# Patient Record
Sex: MALE | Race: WHITE | NOT HISPANIC OR LATINO | URBAN - METROPOLITAN AREA
[De-identification: names, ages, dates, MRNs, and addresses within clinical notes are randomized per-mention and may not be internally consistent; named-entity substitution may affect disease eponyms.]

---

## 2017-03-17 ENCOUNTER — HOME CARE/HOSPICE - HEALTHEAST (OUTPATIENT)
Dept: HOME HEALTH SERVICES | Facility: HOME HEALTH | Age: 82
End: 2017-03-17

## 2017-03-23 ENCOUNTER — OFFICE VISIT - HEALTHEAST (OUTPATIENT)
Dept: GERIATRICS | Facility: CLINIC | Age: 82
End: 2017-03-23

## 2017-03-23 DIAGNOSIS — D62 ACUTE BLOOD LOSS ANEMIA: ICD-10-CM

## 2017-03-23 DIAGNOSIS — I48.19 PERSISTENT ATRIAL FIBRILLATION (H): ICD-10-CM

## 2017-03-23 DIAGNOSIS — Z95.3 S/P AORTIC VALVE REPLACEMENT WITH BIOPROSTHETIC VALVE: ICD-10-CM

## 2017-03-23 DIAGNOSIS — I50.9 CHF EXACERBATION (H): ICD-10-CM

## 2017-03-23 DIAGNOSIS — K59.00 CONSTIPATION: ICD-10-CM

## 2017-03-23 DIAGNOSIS — R04.0 EPISTAXIS: ICD-10-CM

## 2017-03-27 ENCOUNTER — OFFICE VISIT - HEALTHEAST (OUTPATIENT)
Dept: GERIATRICS | Facility: CLINIC | Age: 82
End: 2017-03-27

## 2017-03-27 DIAGNOSIS — R55 SYNCOPE: ICD-10-CM

## 2017-03-27 DIAGNOSIS — I50.32 CHRONIC DIASTOLIC CONGESTIVE HEART FAILURE (H): ICD-10-CM

## 2017-03-27 DIAGNOSIS — K92.1 MELENA: ICD-10-CM

## 2017-03-27 DIAGNOSIS — D62 ACUTE BLOOD LOSS ANEMIA: ICD-10-CM

## 2017-03-27 DIAGNOSIS — Z95.3 S/P AORTIC VALVE REPLACEMENT WITH BIOPROSTHETIC VALVE: ICD-10-CM

## 2017-03-27 DIAGNOSIS — R04.0 EPISTAXIS: ICD-10-CM

## 2017-03-27 DIAGNOSIS — I48.19 PERSISTENT ATRIAL FIBRILLATION (H): ICD-10-CM

## 2017-03-27 DIAGNOSIS — I25.10 ATHEROSCLEROSIS OF NATIVE CORONARY ARTERY OF NATIVE HEART WITHOUT ANGINA PECTORIS: ICD-10-CM

## 2017-03-29 ENCOUNTER — OFFICE VISIT - HEALTHEAST (OUTPATIENT)
Dept: GERIATRICS | Facility: CLINIC | Age: 82
End: 2017-03-29

## 2017-03-29 DIAGNOSIS — E11.8 TYPE 2 DIABETES MELLITUS WITH COMPLICATION, WITHOUT LONG-TERM CURRENT USE OF INSULIN (H): ICD-10-CM

## 2017-03-29 DIAGNOSIS — I50.32 CHRONIC DIASTOLIC CONGESTIVE HEART FAILURE (H): ICD-10-CM

## 2017-03-29 DIAGNOSIS — G47.33 OBSTRUCTIVE SLEEP APNEA (ADULT) (PEDIATRIC): ICD-10-CM

## 2017-03-29 DIAGNOSIS — K92.1 MELENA: ICD-10-CM

## 2017-03-29 DIAGNOSIS — I48.19 PERSISTENT ATRIAL FIBRILLATION (H): ICD-10-CM

## 2017-03-29 DIAGNOSIS — K59.00 CONSTIPATION: ICD-10-CM

## 2017-03-29 DIAGNOSIS — I10 ESSENTIAL HYPERTENSION WITH GOAL BLOOD PRESSURE LESS THAN 140/90: ICD-10-CM

## 2017-03-31 ENCOUNTER — COMMUNICATION - HEALTHEAST (OUTPATIENT)
Dept: CARDIOLOGY | Facility: CLINIC | Age: 82
End: 2017-03-31

## 2017-04-03 ENCOUNTER — AMBULATORY - HEALTHEAST (OUTPATIENT)
Dept: GERIATRICS | Facility: CLINIC | Age: 82
End: 2017-04-03

## 2017-04-04 ENCOUNTER — OFFICE VISIT - HEALTHEAST (OUTPATIENT)
Dept: GERIATRICS | Facility: CLINIC | Age: 82
End: 2017-04-04

## 2017-04-04 DIAGNOSIS — I50.32 CHRONIC DIASTOLIC CONGESTIVE HEART FAILURE (H): ICD-10-CM

## 2017-04-04 DIAGNOSIS — Z79.01 ANTICOAGULATED ON COUMADIN: ICD-10-CM

## 2017-04-04 DIAGNOSIS — G47.33 OBSTRUCTIVE SLEEP APNEA (ADULT) (PEDIATRIC): ICD-10-CM

## 2017-04-04 DIAGNOSIS — I48.19 PERSISTENT ATRIAL FIBRILLATION (H): ICD-10-CM

## 2017-04-04 DIAGNOSIS — I10 ESSENTIAL HYPERTENSION WITH GOAL BLOOD PRESSURE LESS THAN 140/90: ICD-10-CM

## 2017-04-06 ENCOUNTER — OFFICE VISIT - HEALTHEAST (OUTPATIENT)
Dept: GERIATRICS | Facility: CLINIC | Age: 82
End: 2017-04-06

## 2017-04-06 DIAGNOSIS — I48.19 PERSISTENT ATRIAL FIBRILLATION (H): ICD-10-CM

## 2017-04-06 DIAGNOSIS — I50.32 CHRONIC DIASTOLIC CONGESTIVE HEART FAILURE (H): ICD-10-CM

## 2017-04-06 DIAGNOSIS — E11.8 TYPE 2 DIABETES MELLITUS WITH COMPLICATION, WITHOUT LONG-TERM CURRENT USE OF INSULIN (H): ICD-10-CM

## 2017-04-06 DIAGNOSIS — Z79.01 ANTICOAGULATED ON COUMADIN: ICD-10-CM

## 2017-04-06 DIAGNOSIS — R60.0 BILATERAL LOWER EXTREMITY EDEMA: ICD-10-CM

## 2017-04-10 ENCOUNTER — OFFICE VISIT - HEALTHEAST (OUTPATIENT)
Dept: GERIATRICS | Facility: CLINIC | Age: 82
End: 2017-04-10

## 2017-04-10 DIAGNOSIS — I48.19 PERSISTENT ATRIAL FIBRILLATION (H): ICD-10-CM

## 2017-04-10 DIAGNOSIS — I10 ESSENTIAL HYPERTENSION WITH GOAL BLOOD PRESSURE LESS THAN 140/90: ICD-10-CM

## 2017-04-10 DIAGNOSIS — G47.33 OBSTRUCTIVE SLEEP APNEA (ADULT) (PEDIATRIC): ICD-10-CM

## 2017-04-10 DIAGNOSIS — K59.00 CONSTIPATION: ICD-10-CM

## 2017-04-10 DIAGNOSIS — E11.8 TYPE 2 DIABETES MELLITUS WITH COMPLICATION, WITHOUT LONG-TERM CURRENT USE OF INSULIN (H): ICD-10-CM

## 2017-04-10 DIAGNOSIS — I50.32 CHRONIC DIASTOLIC CONGESTIVE HEART FAILURE (H): ICD-10-CM

## 2017-04-10 DIAGNOSIS — R60.0 BILATERAL LOWER EXTREMITY EDEMA: ICD-10-CM

## 2017-04-10 DIAGNOSIS — R55 SYNCOPE: ICD-10-CM

## 2017-04-13 ENCOUNTER — AMBULATORY - HEALTHEAST (OUTPATIENT)
Dept: GERIATRICS | Facility: CLINIC | Age: 82
End: 2017-04-13

## 2017-06-29 ENCOUNTER — AMBULATORY - HEALTHEAST (OUTPATIENT)
Dept: CARDIOLOGY | Facility: CLINIC | Age: 82
End: 2017-06-29

## 2017-07-06 ENCOUNTER — OFFICE VISIT - HEALTHEAST (OUTPATIENT)
Dept: CARDIOLOGY | Facility: CLINIC | Age: 82
End: 2017-07-06

## 2017-07-06 ENCOUNTER — AMBULATORY - HEALTHEAST (OUTPATIENT)
Dept: CARDIOLOGY | Facility: CLINIC | Age: 82
End: 2017-07-06

## 2017-07-06 DIAGNOSIS — I25.10 ATHEROSCLEROSIS OF NATIVE CORONARY ARTERY OF NATIVE HEART WITHOUT ANGINA PECTORIS: ICD-10-CM

## 2017-07-06 DIAGNOSIS — Z79.01 ANTICOAGULATED ON COUMADIN: ICD-10-CM

## 2017-07-06 DIAGNOSIS — R55 VASOVAGAL SYNCOPE: ICD-10-CM

## 2017-07-06 DIAGNOSIS — Z95.3 S/P AORTIC VALVE REPLACEMENT WITH BIOPROSTHETIC VALVE: ICD-10-CM

## 2017-07-06 DIAGNOSIS — I48.19 PERSISTENT ATRIAL FIBRILLATION (H): ICD-10-CM

## 2017-07-06 DIAGNOSIS — I50.32 CHRONIC DIASTOLIC CONGESTIVE HEART FAILURE (H): ICD-10-CM

## 2017-07-06 ASSESSMENT — MIFFLIN-ST. JEOR: SCORE: 1798.44

## 2017-07-13 ENCOUNTER — HOSPITAL ENCOUNTER (OUTPATIENT)
Dept: CARDIOLOGY | Facility: HOSPITAL | Age: 82
Discharge: HOME | End: 2017-07-13
Attending: INTERNAL MEDICINE

## 2017-07-13 DIAGNOSIS — R55 VASOVAGAL SYNCOPE: ICD-10-CM

## 2017-07-13 DIAGNOSIS — I48.19 PERSISTENT ATRIAL FIBRILLATION (H): ICD-10-CM

## 2017-12-22 ENCOUNTER — COMMUNICATION - HEALTHEAST (OUTPATIENT)
Dept: ADMINISTRATIVE | Facility: CLINIC | Age: 82
End: 2017-12-22

## 2018-04-11 ENCOUNTER — AMBULATORY - HEALTHEAST (OUTPATIENT)
Dept: CARDIOLOGY | Facility: CLINIC | Age: 83
End: 2018-04-11

## 2018-04-17 ENCOUNTER — OFFICE VISIT - HEALTHEAST (OUTPATIENT)
Dept: CARDIOLOGY | Facility: CLINIC | Age: 83
End: 2018-04-17

## 2018-04-17 DIAGNOSIS — R55 VASOVAGAL SYNCOPE: ICD-10-CM

## 2018-04-17 DIAGNOSIS — Z95.3 S/P AORTIC VALVE REPLACEMENT WITH BIOPROSTHETIC VALVE: ICD-10-CM

## 2018-04-17 DIAGNOSIS — I10 ESSENTIAL HYPERTENSION: ICD-10-CM

## 2018-04-17 DIAGNOSIS — I50.32 CHRONIC DIASTOLIC CONGESTIVE HEART FAILURE (H): ICD-10-CM

## 2018-04-17 DIAGNOSIS — Z79.01 ANTICOAGULATED ON COUMADIN: ICD-10-CM

## 2018-04-17 DIAGNOSIS — I25.10 ATHEROSCLEROSIS OF NATIVE CORONARY ARTERY OF NATIVE HEART WITHOUT ANGINA PECTORIS: ICD-10-CM

## 2018-04-17 ASSESSMENT — MIFFLIN-ST. JEOR: SCORE: 1793.9

## 2018-06-27 ENCOUNTER — COMMUNICATION - HEALTHEAST (OUTPATIENT)
Dept: CARDIOLOGY | Facility: CLINIC | Age: 83
End: 2018-06-27

## 2018-06-27 DIAGNOSIS — I48.19 PERSISTENT ATRIAL FIBRILLATION (H): ICD-10-CM

## 2018-07-19 ENCOUNTER — HOME CARE/HOSPICE - HEALTHEAST (OUTPATIENT)
Dept: HOME HEALTH SERVICES | Facility: HOME HEALTH | Age: 83
End: 2018-07-19

## 2018-07-23 ENCOUNTER — OFFICE VISIT - HEALTHEAST (OUTPATIENT)
Dept: GERIATRICS | Facility: CLINIC | Age: 83
End: 2018-07-23

## 2018-07-23 ENCOUNTER — AMBULATORY - HEALTHEAST (OUTPATIENT)
Dept: GERIATRICS | Facility: CLINIC | Age: 83
End: 2018-07-23

## 2018-07-23 ENCOUNTER — COMMUNICATION - HEALTHEAST (OUTPATIENT)
Dept: PHARMACY | Facility: CLINIC | Age: 83
End: 2018-07-23

## 2018-07-23 DIAGNOSIS — I50.33 ACUTE ON CHRONIC DIASTOLIC CONGESTIVE HEART FAILURE (H): ICD-10-CM

## 2018-07-23 DIAGNOSIS — Z95.3 S/P AORTIC VALVE REPLACEMENT WITH BIOPROSTHETIC VALVE: ICD-10-CM

## 2018-07-23 DIAGNOSIS — I25.10 ATHEROSCLEROSIS OF NATIVE CORONARY ARTERY OF NATIVE HEART WITHOUT ANGINA PECTORIS: ICD-10-CM

## 2018-07-23 DIAGNOSIS — I10 ESSENTIAL HYPERTENSION WITH GOAL BLOOD PRESSURE LESS THAN 140/90: ICD-10-CM

## 2018-07-24 ENCOUNTER — COMMUNICATION - HEALTHEAST (OUTPATIENT)
Dept: GERIATRICS | Facility: CLINIC | Age: 83
End: 2018-07-24

## 2018-07-26 ENCOUNTER — OFFICE VISIT - HEALTHEAST (OUTPATIENT)
Dept: GERIATRICS | Facility: CLINIC | Age: 83
End: 2018-07-26

## 2018-07-26 DIAGNOSIS — I10 ESSENTIAL HYPERTENSION WITH GOAL BLOOD PRESSURE LESS THAN 140/90: ICD-10-CM

## 2018-07-26 DIAGNOSIS — Z95.3 S/P AORTIC VALVE REPLACEMENT WITH BIOPROSTHETIC VALVE: ICD-10-CM

## 2018-07-26 DIAGNOSIS — I50.33 ACUTE ON CHRONIC DIASTOLIC CONGESTIVE HEART FAILURE (H): ICD-10-CM

## 2018-07-26 DIAGNOSIS — E11.8 TYPE 2 DIABETES MELLITUS WITH COMPLICATION, WITHOUT LONG-TERM CURRENT USE OF INSULIN (H): ICD-10-CM

## 2018-07-26 DIAGNOSIS — G47.33 OSA (OBSTRUCTIVE SLEEP APNEA): ICD-10-CM

## 2018-07-26 DIAGNOSIS — I48.19 PERSISTENT ATRIAL FIBRILLATION (H): ICD-10-CM

## 2018-07-30 ENCOUNTER — OFFICE VISIT - HEALTHEAST (OUTPATIENT)
Dept: GERIATRICS | Facility: CLINIC | Age: 83
End: 2018-07-30

## 2018-07-30 DIAGNOSIS — I10 ESSENTIAL HYPERTENSION WITH GOAL BLOOD PRESSURE LESS THAN 140/90: ICD-10-CM

## 2018-07-30 DIAGNOSIS — E11.8 TYPE 2 DIABETES MELLITUS WITH COMPLICATION, WITHOUT LONG-TERM CURRENT USE OF INSULIN (H): ICD-10-CM

## 2018-07-30 DIAGNOSIS — Z95.3 S/P AORTIC VALVE REPLACEMENT WITH BIOPROSTHETIC VALVE: ICD-10-CM

## 2018-07-30 DIAGNOSIS — I48.19 PERSISTENT ATRIAL FIBRILLATION (H): ICD-10-CM

## 2018-07-30 DIAGNOSIS — I50.33 ACUTE ON CHRONIC DIASTOLIC CONGESTIVE HEART FAILURE (H): ICD-10-CM

## 2018-08-01 ENCOUNTER — COMMUNICATION - HEALTHEAST (OUTPATIENT)
Dept: GERIATRICS | Facility: CLINIC | Age: 83
End: 2018-08-01

## 2018-08-07 ENCOUNTER — RECORDS - HEALTHEAST (OUTPATIENT)
Dept: LAB | Facility: CLINIC | Age: 83
End: 2018-08-07

## 2018-08-07 LAB — INR PPP: 2.08 (ref 0.9–1.1)

## 2018-08-20 ENCOUNTER — AMBULATORY - HEALTHEAST (OUTPATIENT)
Dept: GERIATRICS | Facility: CLINIC | Age: 83
End: 2018-08-20

## 2019-03-12 ENCOUNTER — COMMUNICATION - HEALTHEAST (OUTPATIENT)
Dept: TELEHEALTH | Facility: CLINIC | Age: 84
End: 2019-03-12

## 2019-04-16 ENCOUNTER — COMMUNICATION - HEALTHEAST (OUTPATIENT)
Dept: ADMINISTRATIVE | Facility: CLINIC | Age: 84
End: 2019-04-16

## 2019-05-28 ENCOUNTER — COMMUNICATION - HEALTHEAST (OUTPATIENT)
Dept: CARDIOLOGY | Facility: CLINIC | Age: 84
End: 2019-05-28

## 2019-05-28 DIAGNOSIS — I50.41 ACUTE COMBINED SYSTOLIC AND DIASTOLIC HEART FAILURE (H): ICD-10-CM

## 2019-05-30 ENCOUNTER — AMBULATORY - HEALTHEAST (OUTPATIENT)
Dept: CARDIOLOGY | Facility: CLINIC | Age: 84
End: 2019-05-30

## 2019-05-30 ENCOUNTER — RECORDS - HEALTHEAST (OUTPATIENT)
Dept: ADMINISTRATIVE | Facility: OTHER | Age: 84
End: 2019-05-30

## 2019-06-03 ENCOUNTER — OFFICE VISIT - HEALTHEAST (OUTPATIENT)
Dept: CARDIOLOGY | Facility: CLINIC | Age: 84
End: 2019-06-03

## 2019-06-03 ENCOUNTER — COMMUNICATION - HEALTHEAST (OUTPATIENT)
Dept: CARDIOLOGY | Facility: CLINIC | Age: 84
End: 2019-06-03

## 2019-06-03 DIAGNOSIS — I50.41 ACUTE COMBINED SYSTOLIC AND DIASTOLIC HEART FAILURE (H): ICD-10-CM

## 2019-06-03 DIAGNOSIS — I50.32 CHRONIC DIASTOLIC CONGESTIVE HEART FAILURE (H): ICD-10-CM

## 2019-06-03 DIAGNOSIS — Z95.3 S/P AORTIC VALVE REPLACEMENT WITH BIOPROSTHETIC VALVE: ICD-10-CM

## 2019-06-03 LAB
ANION GAP SERPL CALCULATED.3IONS-SCNC: 10 MMOL/L (ref 5–18)
BNP SERPL-MCNC: 430 PG/ML (ref 0–93)
BUN SERPL-MCNC: 34 MG/DL (ref 8–28)
CALCIUM SERPL-MCNC: 9.2 MG/DL (ref 8.5–10.5)
CHLORIDE BLD-SCNC: 100 MMOL/L (ref 98–107)
CO2 SERPL-SCNC: 30 MMOL/L (ref 22–31)
CREAT SERPL-MCNC: 1.44 MG/DL (ref 0.7–1.3)
ERYTHROCYTE [DISTWIDTH] IN BLOOD BY AUTOMATED COUNT: 16.5 % (ref 11–14.5)
GFR SERPL CREATININE-BSD FRML MDRD: 46 ML/MIN/1.73M2
GLUCOSE BLD-MCNC: 142 MG/DL (ref 70–125)
HCT VFR BLD AUTO: 32.6 % (ref 40–54)
HGB BLD-MCNC: 9.7 G/DL (ref 14–18)
MCH RBC QN AUTO: 27.5 PG (ref 27–34)
MCHC RBC AUTO-ENTMCNC: 29.8 G/DL (ref 32–36)
MCV RBC AUTO: 92 FL (ref 80–100)
PLATELET # BLD AUTO: 160 THOU/UL (ref 140–440)
PMV BLD AUTO: 12.7 FL (ref 8.5–12.5)
POTASSIUM BLD-SCNC: 4.3 MMOL/L (ref 3.5–5)
RBC # BLD AUTO: 3.53 MILL/UL (ref 4.4–6.2)
SODIUM SERPL-SCNC: 140 MMOL/L (ref 136–145)
WBC: 8.5 THOU/UL (ref 4–11)

## 2019-06-03 ASSESSMENT — MIFFLIN-ST. JEOR: SCORE: 1750.81

## 2019-06-21 ENCOUNTER — OFFICE VISIT - HEALTHEAST (OUTPATIENT)
Dept: CARDIOLOGY | Facility: CLINIC | Age: 84
End: 2019-06-21

## 2019-06-21 DIAGNOSIS — I10 ESSENTIAL HYPERTENSION: ICD-10-CM

## 2019-06-21 DIAGNOSIS — I50.32 CHRONIC DIASTOLIC CONGESTIVE HEART FAILURE (H): ICD-10-CM

## 2019-06-21 DIAGNOSIS — I48.19 PERSISTENT ATRIAL FIBRILLATION (H): ICD-10-CM

## 2019-06-21 ASSESSMENT — MIFFLIN-ST. JEOR: SCORE: 1714.52

## 2019-06-28 ENCOUNTER — RECORDS - HEALTHEAST (OUTPATIENT)
Dept: LAB | Facility: CLINIC | Age: 84
End: 2019-06-28

## 2019-06-28 ENCOUNTER — COMMUNICATION - HEALTHEAST (OUTPATIENT)
Dept: GERIATRICS | Facility: CLINIC | Age: 84
End: 2019-06-28

## 2019-06-28 LAB — INR PPP: 2.16 (ref 0.9–1.1)

## 2019-06-29 ENCOUNTER — RECORDS - HEALTHEAST (OUTPATIENT)
Dept: LAB | Facility: CLINIC | Age: 84
End: 2019-06-29

## 2019-06-29 LAB
ANION GAP SERPL CALCULATED.3IONS-SCNC: 6 MMOL/L (ref 5–18)
BUN SERPL-MCNC: 33 MG/DL (ref 8–28)
CALCIUM SERPL-MCNC: 9.2 MG/DL (ref 8.5–10.5)
CHLORIDE BLD-SCNC: 96 MMOL/L (ref 98–107)
CO2 SERPL-SCNC: 35 MMOL/L (ref 22–31)
CREAT SERPL-MCNC: 1.39 MG/DL (ref 0.7–1.3)
GFR SERPL CREATININE-BSD FRML MDRD: 48 ML/MIN/1.73M2
GLUCOSE BLD-MCNC: 114 MG/DL (ref 70–125)
MAGNESIUM SERPL-MCNC: 2 MG/DL (ref 1.8–2.6)
POTASSIUM BLD-SCNC: 3.9 MMOL/L (ref 3.5–5)
SODIUM SERPL-SCNC: 137 MMOL/L (ref 136–145)

## 2019-07-01 ENCOUNTER — RECORDS - HEALTHEAST (OUTPATIENT)
Dept: LAB | Facility: CLINIC | Age: 84
End: 2019-07-01

## 2019-07-01 ENCOUNTER — OFFICE VISIT - HEALTHEAST (OUTPATIENT)
Dept: GERIATRICS | Facility: CLINIC | Age: 84
End: 2019-07-01

## 2019-07-01 ENCOUNTER — COMMUNICATION - HEALTHEAST (OUTPATIENT)
Dept: GERIATRICS | Facility: CLINIC | Age: 84
End: 2019-07-01

## 2019-07-01 DIAGNOSIS — G47.33 OSA (OBSTRUCTIVE SLEEP APNEA): ICD-10-CM

## 2019-07-01 DIAGNOSIS — E11.8 TYPE 2 DIABETES MELLITUS WITH COMPLICATION, WITHOUT LONG-TERM CURRENT USE OF INSULIN (H): ICD-10-CM

## 2019-07-01 DIAGNOSIS — I50.33 ACUTE ON CHRONIC DIASTOLIC CONGESTIVE HEART FAILURE (H): ICD-10-CM

## 2019-07-01 DIAGNOSIS — I48.19 PERSISTENT ATRIAL FIBRILLATION (H): ICD-10-CM

## 2019-07-01 DIAGNOSIS — R60.0 BILATERAL LOWER EXTREMITY EDEMA: ICD-10-CM

## 2019-07-01 LAB — INR PPP: 2.21 (ref 0.9–1.1)

## 2019-07-02 ENCOUNTER — RECORDS - HEALTHEAST (OUTPATIENT)
Dept: LAB | Facility: CLINIC | Age: 84
End: 2019-07-02

## 2019-07-03 ENCOUNTER — COMMUNICATION - HEALTHEAST (OUTPATIENT)
Dept: GERIATRICS | Facility: CLINIC | Age: 84
End: 2019-07-03

## 2019-07-03 ENCOUNTER — OFFICE VISIT - HEALTHEAST (OUTPATIENT)
Dept: GERIATRICS | Facility: CLINIC | Age: 84
End: 2019-07-03

## 2019-07-03 DIAGNOSIS — T07.XXXA EXCORIATION OF MULTIPLE SITES: ICD-10-CM

## 2019-07-03 DIAGNOSIS — D50.8 OTHER IRON DEFICIENCY ANEMIA: ICD-10-CM

## 2019-07-03 DIAGNOSIS — E11.8 TYPE 2 DIABETES MELLITUS WITH COMPLICATION, WITHOUT LONG-TERM CURRENT USE OF INSULIN (H): ICD-10-CM

## 2019-07-03 DIAGNOSIS — I50.33 ACUTE ON CHRONIC DIASTOLIC CONGESTIVE HEART FAILURE (H): ICD-10-CM

## 2019-07-03 DIAGNOSIS — N18.30 CHRONIC RENAL DISEASE, STAGE III (H): ICD-10-CM

## 2019-07-03 DIAGNOSIS — I48.19 PERSISTENT ATRIAL FIBRILLATION (H): ICD-10-CM

## 2019-07-03 DIAGNOSIS — Z95.3 S/P AORTIC VALVE REPLACEMENT WITH BIOPROSTHETIC VALVE: ICD-10-CM

## 2019-07-03 LAB
ERYTHROCYTE [DISTWIDTH] IN BLOOD BY AUTOMATED COUNT: 16.4 % (ref 11–14.5)
HCT VFR BLD AUTO: 31.7 % (ref 40–54)
HGB BLD-MCNC: 9.3 G/DL (ref 14–18)
MCH RBC QN AUTO: 26.8 PG (ref 27–34)
MCHC RBC AUTO-ENTMCNC: 29.3 G/DL (ref 32–36)
MCV RBC AUTO: 91 FL (ref 80–100)
PLATELET # BLD AUTO: 182 THOU/UL (ref 140–440)
PMV BLD AUTO: 12.1 FL (ref 8.5–12.5)
RBC # BLD AUTO: 3.47 MILL/UL (ref 4.4–6.2)
WBC: 7.8 THOU/UL (ref 4–11)

## 2019-07-03 RX ORDER — FERROUS SULFATE 325(65) MG
1 TABLET ORAL 2 TIMES DAILY
Status: SHIPPED | COMMUNITY
Start: 2019-07-03

## 2019-07-05 ENCOUNTER — RECORDS - HEALTHEAST (OUTPATIENT)
Dept: LAB | Facility: CLINIC | Age: 84
End: 2019-07-05

## 2019-07-08 ENCOUNTER — OFFICE VISIT - HEALTHEAST (OUTPATIENT)
Dept: GERIATRICS | Facility: CLINIC | Age: 84
End: 2019-07-08

## 2019-07-08 DIAGNOSIS — I50.33 ACUTE ON CHRONIC DIASTOLIC CONGESTIVE HEART FAILURE (H): ICD-10-CM

## 2019-07-08 DIAGNOSIS — R60.0 BILATERAL LOWER EXTREMITY EDEMA: ICD-10-CM

## 2019-07-08 DIAGNOSIS — L03.113 CELLULITIS OF RIGHT UPPER EXTREMITY: ICD-10-CM

## 2019-07-08 DIAGNOSIS — D64.9 ANEMIA, UNSPECIFIED TYPE: ICD-10-CM

## 2019-07-08 DIAGNOSIS — I48.19 PERSISTENT ATRIAL FIBRILLATION (H): ICD-10-CM

## 2019-07-08 LAB
ANION GAP SERPL CALCULATED.3IONS-SCNC: 9 MMOL/L (ref 5–18)
BUN SERPL-MCNC: 34 MG/DL (ref 8–28)
CALCIUM SERPL-MCNC: 9.4 MG/DL (ref 8.5–10.5)
CHLORIDE BLD-SCNC: 101 MMOL/L (ref 98–107)
CO2 SERPL-SCNC: 29 MMOL/L (ref 22–31)
CREAT SERPL-MCNC: 1.29 MG/DL (ref 0.7–1.3)
GFR SERPL CREATININE-BSD FRML MDRD: 53 ML/MIN/1.73M2
GLUCOSE BLD-MCNC: 101 MG/DL (ref 70–125)
INR PPP: 2.4 (ref 0.9–1.1)
POTASSIUM BLD-SCNC: 4.3 MMOL/L (ref 3.5–5)
SODIUM SERPL-SCNC: 139 MMOL/L (ref 136–145)

## 2019-07-10 ENCOUNTER — RECORDS - HEALTHEAST (OUTPATIENT)
Dept: LAB | Facility: CLINIC | Age: 84
End: 2019-07-10

## 2019-07-11 ENCOUNTER — OFFICE VISIT - HEALTHEAST (OUTPATIENT)
Dept: GERIATRICS | Facility: CLINIC | Age: 84
End: 2019-07-11

## 2019-07-11 ENCOUNTER — RECORDS - HEALTHEAST (OUTPATIENT)
Dept: LAB | Facility: CLINIC | Age: 84
End: 2019-07-11

## 2019-07-11 DIAGNOSIS — N18.30 CHRONIC RENAL DISEASE, STAGE III (H): ICD-10-CM

## 2019-07-11 DIAGNOSIS — I48.19 PERSISTENT ATRIAL FIBRILLATION (H): ICD-10-CM

## 2019-07-11 DIAGNOSIS — D64.9 ANEMIA, UNSPECIFIED TYPE: ICD-10-CM

## 2019-07-11 DIAGNOSIS — Z95.3 S/P AORTIC VALVE REPLACEMENT WITH BIOPROSTHETIC VALVE: ICD-10-CM

## 2019-07-11 DIAGNOSIS — E11.8 TYPE 2 DIABETES MELLITUS WITH COMPLICATION, WITHOUT LONG-TERM CURRENT USE OF INSULIN (H): ICD-10-CM

## 2019-07-11 DIAGNOSIS — I50.33 ACUTE ON CHRONIC DIASTOLIC CONGESTIVE HEART FAILURE (H): ICD-10-CM

## 2019-07-11 LAB
HGB BLD-MCNC: 9.7 G/DL (ref 14–18)
INR PPP: 2.14 (ref 0.9–1.1)

## 2019-07-15 ENCOUNTER — AMBULATORY - HEALTHEAST (OUTPATIENT)
Dept: GERIATRICS | Facility: CLINIC | Age: 84
End: 2019-07-15

## 2019-08-06 ENCOUNTER — COMMUNICATION - HEALTHEAST (OUTPATIENT)
Dept: GERIATRICS | Facility: CLINIC | Age: 84
End: 2019-08-06

## 2019-08-06 ENCOUNTER — RECORDS - HEALTHEAST (OUTPATIENT)
Dept: LAB | Facility: CLINIC | Age: 84
End: 2019-08-06

## 2019-08-06 LAB — INR PPP: 2.28 (ref 0.9–1.1)

## 2019-08-07 ENCOUNTER — RECORDS - HEALTHEAST (OUTPATIENT)
Dept: LAB | Facility: CLINIC | Age: 84
End: 2019-08-07

## 2019-08-08 ENCOUNTER — OFFICE VISIT - HEALTHEAST (OUTPATIENT)
Dept: GERIATRICS | Facility: CLINIC | Age: 84
End: 2019-08-08

## 2019-08-08 DIAGNOSIS — R29.6 FREQUENT FALLS: ICD-10-CM

## 2019-08-08 DIAGNOSIS — Z71.89 ACP (ADVANCE CARE PLANNING): ICD-10-CM

## 2019-08-08 DIAGNOSIS — E11.8 TYPE 2 DIABETES MELLITUS WITH COMPLICATION, WITHOUT LONG-TERM CURRENT USE OF INSULIN (H): ICD-10-CM

## 2019-08-08 DIAGNOSIS — Z79.01 ANTICOAGULATED ON COUMADIN: ICD-10-CM

## 2019-08-08 LAB — INR PPP: 2.3 (ref 0.9–1.1)

## 2019-08-09 ENCOUNTER — RECORDS - HEALTHEAST (OUTPATIENT)
Dept: LAB | Facility: CLINIC | Age: 84
End: 2019-08-09

## 2019-08-12 LAB
ANION GAP SERPL CALCULATED.3IONS-SCNC: 7 MMOL/L (ref 5–18)
BUN SERPL-MCNC: 30 MG/DL (ref 8–28)
CALCIUM SERPL-MCNC: 9.3 MG/DL (ref 8.5–10.5)
CHLORIDE BLD-SCNC: 103 MMOL/L (ref 98–107)
CO2 SERPL-SCNC: 31 MMOL/L (ref 22–31)
CREAT SERPL-MCNC: 1.09 MG/DL (ref 0.7–1.3)
ERYTHROCYTE [DISTWIDTH] IN BLOOD BY AUTOMATED COUNT: 18.9 % (ref 11–14.5)
GFR SERPL CREATININE-BSD FRML MDRD: >60 ML/MIN/1.73M2
GLUCOSE BLD-MCNC: 111 MG/DL (ref 70–125)
HCT VFR BLD AUTO: 31.1 % (ref 40–54)
HGB BLD-MCNC: 9.4 G/DL (ref 14–18)
INR PPP: 2.51 (ref 0.9–1.1)
MAGNESIUM SERPL-MCNC: 2 MG/DL (ref 1.8–2.6)
MCH RBC QN AUTO: 27.4 PG (ref 27–34)
MCHC RBC AUTO-ENTMCNC: 30.2 G/DL (ref 32–36)
MCV RBC AUTO: 91 FL (ref 80–100)
PLATELET # BLD AUTO: 169 THOU/UL (ref 140–440)
PMV BLD AUTO: 12.1 FL (ref 8.5–12.5)
POTASSIUM BLD-SCNC: 4.1 MMOL/L (ref 3.5–5)
RBC # BLD AUTO: 3.43 MILL/UL (ref 4.4–6.2)
SODIUM SERPL-SCNC: 141 MMOL/L (ref 136–145)
WBC: 6.2 THOU/UL (ref 4–11)

## 2019-08-16 ENCOUNTER — RECORDS - HEALTHEAST (OUTPATIENT)
Dept: LAB | Facility: CLINIC | Age: 84
End: 2019-08-16

## 2019-08-19 ENCOUNTER — COMMUNICATION - HEALTHEAST (OUTPATIENT)
Dept: GERIATRICS | Facility: CLINIC | Age: 84
End: 2019-08-19

## 2019-08-19 ENCOUNTER — OFFICE VISIT - HEALTHEAST (OUTPATIENT)
Dept: GERIATRICS | Facility: CLINIC | Age: 84
End: 2019-08-19

## 2019-08-19 DIAGNOSIS — R60.0 BILATERAL LOWER EXTREMITY EDEMA: ICD-10-CM

## 2019-08-19 DIAGNOSIS — E11.8 TYPE 2 DIABETES MELLITUS WITH COMPLICATION, WITHOUT LONG-TERM CURRENT USE OF INSULIN (H): ICD-10-CM

## 2019-08-19 DIAGNOSIS — I50.33 ACUTE ON CHRONIC DIASTOLIC CONGESTIVE HEART FAILURE (H): ICD-10-CM

## 2019-08-19 DIAGNOSIS — E86.0 DEHYDRATION: ICD-10-CM

## 2019-08-19 DIAGNOSIS — I48.19 PERSISTENT ATRIAL FIBRILLATION (H): ICD-10-CM

## 2019-08-19 DIAGNOSIS — R29.6 FREQUENT FALLS: ICD-10-CM

## 2019-08-19 LAB — INR PPP: 2.86 (ref 0.9–1.1)

## 2019-08-20 ENCOUNTER — OFFICE VISIT - HEALTHEAST (OUTPATIENT)
Dept: GERIATRICS | Facility: CLINIC | Age: 84
End: 2019-08-20
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
## 2019-08-20 DIAGNOSIS — R53.81 PHYSICAL DECONDITIONING: ICD-10-CM

## 2019-08-20 DIAGNOSIS — E11.8 TYPE 2 DIABETES MELLITUS WITH COMPLICATION, WITHOUT LONG-TERM CURRENT USE OF INSULIN (H): ICD-10-CM

## 2019-08-23 ENCOUNTER — RECORDS - HEALTHEAST (OUTPATIENT)
Dept: LAB | Facility: CLINIC | Age: 84
End: 2019-08-23

## 2019-08-26 ENCOUNTER — OFFICE VISIT - HEALTHEAST (OUTPATIENT)
Dept: GERIATRICS | Facility: CLINIC | Age: 84
End: 2019-08-26

## 2019-08-26 DIAGNOSIS — I48.19 PERSISTENT ATRIAL FIBRILLATION (H): ICD-10-CM

## 2019-08-26 DIAGNOSIS — R29.6 FREQUENT FALLS: ICD-10-CM

## 2019-08-26 DIAGNOSIS — R26.2 DIFFICULTY WALKING: ICD-10-CM

## 2019-08-26 DIAGNOSIS — I10 ESSENTIAL HYPERTENSION WITH GOAL BLOOD PRESSURE LESS THAN 140/90: ICD-10-CM

## 2019-08-26 DIAGNOSIS — Z74.09 IMPAIRED MOBILITY: ICD-10-CM

## 2019-08-26 LAB — INR PPP: 2.61 (ref 0.9–1.1)

## 2019-08-28 ENCOUNTER — RECORDS - HEALTHEAST (OUTPATIENT)
Dept: LAB | Facility: CLINIC | Age: 84
End: 2019-08-28

## 2019-08-29 ENCOUNTER — OFFICE VISIT - HEALTHEAST (OUTPATIENT)
Dept: GERIATRICS | Facility: CLINIC | Age: 84
End: 2019-08-29

## 2019-08-29 DIAGNOSIS — E11.8 TYPE 2 DIABETES MELLITUS WITH COMPLICATION, WITHOUT LONG-TERM CURRENT USE OF INSULIN (H): ICD-10-CM

## 2019-08-29 DIAGNOSIS — R29.6 FREQUENT FALLS: ICD-10-CM

## 2019-08-29 DIAGNOSIS — R53.81 PHYSICAL DECONDITIONING: ICD-10-CM

## 2019-08-29 DIAGNOSIS — I48.19 PERSISTENT ATRIAL FIBRILLATION (H): ICD-10-CM

## 2019-08-29 DIAGNOSIS — I10 ESSENTIAL HYPERTENSION WITH GOAL BLOOD PRESSURE LESS THAN 140/90: ICD-10-CM

## 2019-08-29 LAB — INR PPP: 3.25 (ref 0.9–1.1)

## 2019-08-30 ENCOUNTER — RECORDS - HEALTHEAST (OUTPATIENT)
Dept: LAB | Facility: CLINIC | Age: 84
End: 2019-08-30

## 2019-09-03 LAB — INR PPP: 3.1 (ref 0.9–1.1)

## 2019-09-04 ENCOUNTER — AMBULATORY - HEALTHEAST (OUTPATIENT)
Dept: GERIATRICS | Facility: CLINIC | Age: 84
End: 2019-09-04

## 2021-05-26 ENCOUNTER — RECORDS - HEALTHEAST (OUTPATIENT)
Dept: ADMINISTRATIVE | Facility: CLINIC | Age: 86
End: 2021-05-26

## 2021-05-29 NOTE — TELEPHONE ENCOUNTER
Per  next HF clinic available was about 2 weeks when scheduling RAC appt for the patient. CMM,RN    Outgoing call to patient daughter. Review of plan of care and increase in water pill. Daughter will discuss with patient and go later today to set-up the medication change in his pill box. Discussion with daughter to request pt to weight himself daily,first thing in the morning, and keep a log of weights to see if medication increase is effective. Discussion with daughter to take the water pill at least 6 hours-8 hours apart but not too late into the evening so patient isn't up urinating all evening. She will discuss with her father, and call if any questions. BASILIORN

## 2021-05-29 NOTE — TELEPHONE ENCOUNTER
----- Message from Polly Ruffin sent at 5/28/2019 11:26 AM CDT -----  Contact: Pt's daughter Katelynn  General phone call:    Caller:   Primary cardiologist: Dr. Callejas  Detailed reason for call: Pt's daughter stated [facility] spoke w/her Dad today and said his symptoms are the same- daughter states this is not true- he has shortness of breath, very weak, barely makes it up the stairs and his INR is increasing.  Pls call her back- she needs help to get her Dad to clinic and MAT has no openings that will work   New or active symptoms? yes  Best phone number: pls call daughter: Katelynn @ [phone] or cell: [phone]  Best time to contact:   Ok to leave a detailed message?  Device?     Additional Info:

## 2021-05-29 NOTE — TELEPHONE ENCOUNTER
"Called and spoke to daughter which states that she is concerned about her father.   He is becoming more sedentary not out of his home much. She has seen an increase in his shortness of breath, weakness and fatigue. He does have swallowing issues and has had shortness of breath but this seems to be progressing especially in the past few weeks. Also his INR reading which was maintained around 2.00-2.1(for several weeks in a row) now has crept up to 3.6, and needed adjusting. States that her dad hasn't had chest pain, and she isn't sure that he is weighing himself at home. She is concerned that his heart failure is becoming worse, and this is mirroring when he had to be hospitalized last year for his HF.  Writer had spoken with patient via phoe whom was difficult to assess via phone as he stated, \"this is nothing new, I am fine. I have a swallowing issue with shortness of breath\".  Discussion of symptoms with daughter. Again verbalizes concerns of increased CANAS with same activities; up the stairs in his home and he could barely make it. She reports that family checks on him daily. Discussion if any worsening of symptoms; lightheadedness, dizziness, increase in CANAS, chest pain or palpitations to go into ER for evaluation. States that her dad will be reluctant to go to the hospital, but she will if necessary.   Recommendation for RAC appt next available over await MAT appt. Will forward to MAT for any further recommendations. RAC appt made for 6/3/19. CMM,RN  "

## 2021-05-29 NOTE — TELEPHONE ENCOUNTER
Per OV 6/3/19:       Plan:  Basic metabolic panel, BNP and CBC today  Continue furosemide 40 mg twice a day and spironolactone, may consider hospitalization for IV diuretics if edema does not resolve with current dosing of furosemide  Echo  Follow-up based on the results of the blood test and echo        Pt was waiting at exit scheduling wanting to schedule echocardiogram. Order placed per documentation above. Rx for furosemide sent to appropriate pharmacy as it was in no print mode. [place] scheduling. [place] [place] Darby,  I placed order for echo based on chart documentation and patient was waiting at exit scheduling. If this was not part of the plan, let me know.  Thanks,  Mal

## 2021-05-29 NOTE — TELEPHONE ENCOUNTER
----- Message from Rosa Quan sent at 6/3/2019 11:54 AM CDT -----  Regarding: PT ? 's after visit w/ GEORGIE   PT is here at exit, asking if prescription for furosemide was placed, doesn't look like it from AVS, also AVS says echo is needed but no order for one?  What should I do here?     Rosa ARANA. [id number]

## 2021-05-29 NOTE — TELEPHONE ENCOUNTER
"Called and spoke with the patient. Discussion with patient states, \"this is nothing new. I am fine. I have a swallowing issue with shortness of breath.\" Denies chest pain or nay new symptoms. Reports that he has an appt with his PCP in the next couple of weeks. Requests to make appt with MAT. States that his daughter Katelynn makes his appointments. Requests that [facility] call his daughter to make appt [phone]. Will send message to schedulers to complete. Encouraged patient to call back the clinic if anything changes. BASILIO,RN  "

## 2021-05-29 NOTE — TELEPHONE ENCOUNTER
----- Message -----  From: Andrew Callejas DO  Sent: 5/28/2019   3:00 PM  To: Castillo Taylor RN    Recommend patient increases lasix to 40 mg two times a day.  RAC on 6/3.  Are there any openings with a HF NP?      Thanks,     Clyde

## 2021-05-29 NOTE — PATIENT INSTRUCTIONS - HE
Modesto Martinez,    It was a pleasure to see you today at the [facility].     My recommendations after this visit include:    Continue current dose of furosemide  Blood work and echocardiogram      WLiu Darby MD, FACC, FE

## 2021-05-29 NOTE — PATIENT INSTRUCTIONS - HE
Modesto Martinez,    It was a pleasure to see you today at the [facility].     My recommendations after this visit include:  - Please follow up with Dr Callejas in 6-8 weeks   - Please follow up with Mackenzie Richardson in 3 weeks      Mackenzie Richardson CNP    What is the [facility]?     The [facility] is a heart failure specialty clinic within [facility].  You will work with your cardiologist, nurse practitioner, and nurses to carefully adjust medications and learn how to live with heart failure.  The Heart Failure Program will help you:      Better understand your chronic heart condition    Feel better and avoid hospital stays    Monitoring for Symptoms      Call the Heart Failure Phone Line ([phone]) if you have any of these symptoms:     Increased shortness of breath/shortness of breath at rest    Waking up at night with difficulty breathing    Unable to lie down for sleep due to symptoms or needing to sit upright for sleep    Weight gain of 2 pounds a day for 2 days in a row OR 5 pounds in 1 week    Increased swelling in your ankles or legs    Dizziness or lightheadedness    Medications       Take your medications as prescribed    Bring all your medications in their original bottles to every appointment    Avoid non-steroidal anti-inflammatory medications (Advil, Aleve, Ibuprofen, Naprosyn, Naproxen, Celebrex)    Do not stop taking your medications or begin taking over-the-counter or herbal medications without first talking to your doctor or nurse practitioner    Diet and Lifestyle       Limit sodium/salt to 2000 mg daily   o Read food labels for sodium content  o Do not add salt when cooking or add salt at the table    Weigh yourself every day and record in your daily weight log   o Call if you gain 2 pounds a day for 2 days in a row OR 5 pounds in 1 week  o Bring daily weight log to every appointment    Stay active, pace yourself,  listen to your body, and rest when tired    Elevate your legs if they are swollen. Ask about using compression/support stockings    Stop smoking    Lose weight if you are overweight    Avoid drinking alcohol or limit amount    Stay updated on your immunizations including flu and pneumonia vaccines

## 2021-05-29 NOTE — TELEPHONE ENCOUNTER
----- Message from Angie Francisco sent at 5/28/2019  8:15 AM CDT -----  Contact: PERI  General phone call:    Caller: amado  Primary cardiologist: dr callejas  Detailed reason for call: pt sent a message in [facility] stating that he was having shortness of breath and wanted to get in to see dr gabby amaro. Pt is past due to see dr callejas, I have tried to contact him twice to set something up. Dr Callejas has no availably until the August calendar is released. Please call patient and see if he needs to be seen sooner  New or active symptoms? active  Best phone number: [phone]  Best time to contact: any  Ok to leave a detailed message? yes  Device? no    Additional Info:

## 2021-05-30 VITALS — WEIGHT: 247 LBS | BODY MASS INDEX: 32.59 KG/M2

## 2021-05-30 VITALS — WEIGHT: 248.9 LBS | BODY MASS INDEX: 32.84 KG/M2

## 2021-05-30 VITALS — WEIGHT: 257.3 LBS | BODY MASS INDEX: 33.95 KG/M2

## 2021-05-30 VITALS — BODY MASS INDEX: 33.95 KG/M2 | WEIGHT: 257.3 LBS

## 2021-05-30 VITALS — BODY MASS INDEX: 32.81 KG/M2 | WEIGHT: 248.7 LBS

## 2021-05-30 NOTE — TELEPHONE ENCOUNTER
I SPOKE WITH MS GRIFFITH. BIOPSY RESULTS GIVEN.   [facility] Nurse Triage Anticoagulation Note      Provider: TYRESE Diamond  Facility: [facility]    Facility Type: TCU    Caller: Lesley  Call Back Number:  [phone]    Reason for call: INR    Today s INR: 2.21  Previous INR: 6/28/19 INR 2.16 4.5mg M-W-F, 3mg AOD.    Diagnosis/Goal: AFIB  Heparin/Lovenox: No   Currently on ABX: No  Other interacting Medications: None  Missed or refused doses: No    Verbal Order/Direction given by Provider: Cont 4.5mg M-W-F, 3mg AOD. Next INR 7/8.    Provider giving order: TYRESE Diamond    Verbal order given to: Lesley Aceves RN

## 2021-05-30 NOTE — TELEPHONE ENCOUNTER
[facility] Nurse Triage Telephone Note      Provider: TYRESE Diamond  Facility: [facility]    Facility Type: TCU    Caller: Montse  Call Back Number:  [phone]    Allergies: Levaquin [levofloxacin]    Reason for call: Nurse calling to report Heme 2 results.  Patient is not currently receiving ferrous sulfate.       Verbal Order/Direction given by Provider: Start ferrous sulfate 325mg daily.  Check Hgb on 7/11/19.      Provider giving order: TYRESE Diamond    Verbal order given to: Montse Roberto RN

## 2021-05-30 NOTE — PROGRESS NOTES
[facility]    Facility:   [facility] [[id number]]   Code Status: DNR  PCP: Rik Lobato MD   Phone: [phone]   Fax: [phone]      CHIEF COMPLAINT/REASON FOR VISIT:  Chief Complaint   Patient presents with     Discharge Summary       HISTORY COURSE:  Mr. Modesto Martinez is an 87-year-old man who was most recently hospitalized for acute on chronic congestive heart failure.  He has other underlying medical conditions of diabetes mellitus, atrial fibrillation, anemia, chronic kidney disease, stage III, and status post aortic valve replacement with bioprosthetic valve.    During his time in transitional care unit his hemoglobin was 9.7 on 7/11 and the INR was 2.14.  His dose of Coumadin at time of discharge is 4.5 mg on Monday Wednesday Friday and 3 mg on Tuesday Thursday Saturday Sunday.  The plan is to recheck INR in 1 week.  Also his glucometer readings were decreased to just once daily and his most recent blood glucose was 120.  He is not on insulin.  On review of systems he has not had any new problems such as fevers or chills or cough or shortness of breath beyond his baseline.  He is not experiencing chest pain or palpitations of the heart.  He does not have abdominal pain or nausea.    Review of Systems    Vitals:    07/11/19 1257   BP: 119/53   Pulse: 77   Resp: 18   Temp: 98  F (36.7  C)   SpO2: 97%       Physical Exam   Constitutional: No distress.   HENT:   Nose: Nose normal.   Eyes: Right eye exhibits no discharge. Left eye exhibits no discharge.   Neck: No JVD present.   Cardiovascular: Normal heart sounds.   Pulmonary/Chest: Breath sounds normal. No respiratory distress.   Neurological: He is alert.   Psychiatric: He has a normal mood and affect.   Nursing note and vitals reviewed.      MEDICATION LIST:  No current outpatient medications on file.       DISCHARGE DIAGNOSIS:    ICD-10-CM    1. Acute on chronic diastolic congestive heart failure (H) I50.33    2.  Persistent atrial fibrillation (H) I48.1    3. Type 2 diabetes mellitus with complication, without long-term current use of insulin (H) E11.8    4. Anemia, unspecified type D64.9    5. Chronic renal disease, stage III (H) N18.3    6. S/P aortic valve replacement with bioprosthetic valve Z95.3        MEDICAL EQUIPMENT NEEDS:  No new durable medical equipment.    DISCHARGE PLAN/FACE TO FACE:  I certify that services are/were furnished while this patient was under the care of a physician and that a physician or an allowed non-physician practitioner (NPP), had a face-to-face encounter that meets the physician face-to-face encounter requirements. The encounter was in whole, or in part, related to the primary reason for home health. The patient is confined to his/her home and needs intermittent skilled nursing, physical therapy, speech-language pathology, or the continued need for occupational therapy. A plan of care has been established by a physician and is periodically reviewed by a physician.  Date of Face-to-Face Encounter: 7/11/2019.    I certify that, based on my findings, the following services are medically necessary home health services: Skilled nursing, home health aide, home occupational therapy, and home physical therapy.    My clinical findings support the need for the above skilled services because: (Please write a brief narrative summary that describes what the RN, PT, SLP, or other services will be doing in the home. A list of diagnoses in this section does not meet the CMS requirements.)  Skilled nursing visits are for medication set up and teaching, symptom and disease process monitoring and education.  Home health aide services are for bathing and ADL needs.  Home physical therapy is to evaluate and treat for strengthening, balance, endurance, and safety with mobility and ambulation.  Home occupational therapy is to evaluate and treat for strengthening, ADL needs, adaptive equipment and safety.    This  patient is homebound because: (Please write a brief narrative summary describing the functional limitations as to why this patient is homebound and specifically what makes this patient homebound.)  He continues to have weakness and unsteadiness of gait.    The patient is, or has been, under my care and I have initiated the establishment of the plan of care. This patient will be followed by a physician who will periodically review the plan of care.    Schedule follow up visit with primary care provider within 7 days to reestablish care.    Electronically signed by: Carlos Delgado MD

## 2021-05-30 NOTE — TELEPHONE ENCOUNTER
[facility] Nurse Triage Anticoagulation Note      Provider: MD Delgado Alden  Facility: [facility]    Facility Type: TCU    Caller: Nohemi   Call Back Number:  [phone]    Reason for call: INR    Today s INR: 2.16  Previous INR: 6/27 2.31 4.5mg M, W, F and 3mg AOD    Diagnosis/Goal: Tissue/Bio Prosthetic Valve  Heparin/Lovenox: No   Currently on ABX: No  Other interacting Medications: None  Missed or refused doses: No    Verbal Order/Direction given by Provider: 4.5mg M, W, F and 3mg AOD Next INR 7/1/19    Provider giving order: MD Delgado Alden    Verbal order given to: Nohemi Graves RN

## 2021-05-30 NOTE — PROGRESS NOTES
[facility]    NAME:  Modesto Martinez             :  1932  MRN: [id number]  CODE STATUS:  DNR    FACILITY:  [facility] [[id number]]       ROOM:   239    CHIEF COMPLAINT/REASON FOR VISIT:  Chief Complaint   Patient presents with     Problem Visit     Atrial Fibrillation and Anemia     HISTORY OF PRESENT ILLNESS: Modesto Martinez is a 87 y.o. male with history of atrial fibrillation diastolic and systolic CHF and aortic valve replacement presents to [facility] as a direct admission from cardiology clinic due to inability to fully manage heart failure in the outpatient setting.      1.Acute combined systolic and diastolic CHF, NYHA class 1  -Cardiology following, appreciate input  -Has been on iv lasix at 80mg IV two times a day, spironolactone. Today cards just switched to po lasix at high dose of 80 mg two times a day, will stay on this per cards  -leg edema better, more so on left leg, so I did check u/son right-neg for dvt, not today right leg is softer  -Monitor I/O, daily weight , weight trending down and diuresing well  -will need close f/u with heart failure clinic after dc, such as in 3-5 days     2. Obstructive Sleep Apnea -continue CPAP     3. Type 2 diabetes mellitus with complication, without long-term current use of insulin (H) -continue home medications with sliding scale  -I will hold glip due to low bs, I think he will be checked with SS at TCU and diet control test. I think he has been running too tight     4.Anticoagulated on Coumadin -pharmacy to dose Coumadin  -daily inr     5.S/P aortic valve replacement with bioprosthetic valve      6.Persistent atrial fibrillation (H) -continue diltiazem,dig,warfarin     7.Ear lesion- has numerous scabs ,and he notes he picks at them at night. WOC did see      8.Anemia  -I am not sure if this is renal driven, no sign of loss, other than he scratches at scabs on face  -I called daughter, Katelynn, who is a  retired NP and discussed this. He has never had colonoscopy and would never agree  -there is a chance malignancy could play a factor here, daughter ok not working up     9.Scabs on face  -I did share my concern that some of the lesions maybe skin cancer  -he refuses to have anything removed, daughter aware     10.DVT prevent-coumadin   Patient was stabilized and transferred to TCU for continued rehabilitation.    Today, patient is seen at the beside.  He has some open area on his RUE that is malodorous with green drainage.  Afebrile.  No pain.  Bilateral lower extremity edema.  Anticoagulated with Coumadin for Atrial Fibrillatin.  INR today 2.40. Last Hgb 9.3.  No active bleeding.     Past Medical History:   Diagnosis Date     Aortic stenosis      Arthritis     knees     Atrial fibrillation (H)      CAD (coronary artery disease)      CHF (congestive heart failure) (H)      Diabetes mellitus, type II (H)      HTN (hypertension)      Hyperlipidemia     controlled on medications     KRISHAN (obstructive sleep apnea)     no CPAP     Past Surgical History:   Procedure Laterality Date     BACK SURGERY  1970    L5     CARDIAC VALVE SURGERY      cow valve     EYE SURGERY Bilateral     cataracts     Family History   Problem Relation Age of Onset     Cancer Mother      Diabetes Father      Social History     Socioeconomic History     Marital status:      Spouse name: Not on file     Number of children: Not on file     Years of education: Not on file     Highest education level: Not on file   Occupational History     Not on file   Social Needs     Financial resource strain: Not on file     Food insecurity:     Worry: Not on file     Inability: Not on file     Transportation needs:     Medical: Not on file     Non-medical: Not on file   Tobacco Use     Smoking status: Never Smoker     Smokeless tobacco: Never Used   Substance and Sexual Activity     Alcohol use: No     Drug use: No     Sexual activity: Not on file    Lifestyle     Physical activity:     Days per week: Not on file     Minutes per session: Not on file     Stress: Not on file   Relationships     Social connections:     Talks on phone: Not on file     Gets together: Not on file     Attends Jehovah's witness service: Not on file     Active member of club or organization: Not on file     Attends meetings of clubs or organizations: Not on file     Relationship status: Not on file     Intimate partner violence:     Fear of current or ex partner: Not on file     Emotionally abused: Not on file     Physically abused: Not on file     Forced sexual activity: Not on file   Other Topics Concern     Not on file   Social History Narrative     Not on file     Allergies   Allergen Reactions     Levaquin [Levofloxacin]      Tendonitis     Current Outpatient Medications   Medication Sig Dispense Refill     acetaminophen (TYLENOL) 325 MG tablet Take 2 tablets (650 mg total) by mouth every 4 (four) hours as needed for pain or fever. 20 tablet 0     atorvastatin (LIPITOR) 40 MG tablet Take 1 tablet (40 mg total) by mouth at bedtime. lipids 30 tablet 0     cholecalciferol, vitamin D3, 1,000 unit tablet Take 1 tablet (1,000 Units total) by mouth at bedtime. General health 30 tablet 0     cyanocobalamin 1000 MCG tablet Take 1 tablet (1,000 mcg total) by mouth at bedtime. b12 def 30 tablet 0     digoxin (LANOXIN) 125 mcg tablet Take 1 tablet (125 mcg total) by mouth daily with supper. afib 30 tablet 0     diltiazem (CARDIZEM CD) 120 MG 24 hr capsule Take 1 capsule (120 mg total) by mouth every morning. afib 30 capsule 0     ferrous sulfate 325 (65 FE) MG tablet Take 1 tablet by mouth daily.       furosemide (LASIX) 80 MG tablet Take 1 tablet (80 mg total) by mouth 2 (two) times a day at 9am and 6pm. For chf 60 tablet 0     lansoprazole (PREVACID) 30 MG capsule Take 1 capsule (30 mg total) by mouth every morning. gerd 30 capsule 0     NOVOLOG FLEXPEN U-100 INSULIN 100 unit/mL (3 mL) injection  pen Check blood sugar three (3) times daily.  11.9 Type 2 without complications  BD Ultra-fine Shalonda Pen Needles - NDC [address]-59 - dispense 1 case, refill PRN for 1 year  Microlet Color Lancets (100s) - dispense 1, refill PRN for 1 year 5 Pre-filled Pen Syringe PRN     spironolactone (ALDACTONE) 25 MG tablet Take 0.5 tablets (12.5 mg total) by mouth every morning. chf 30 tablet 0     warfarin (COUMADIN/JANTOVEN) 3 MG tablet Take 1 tablet (3 mg total) by mouth See Admin Instructions. Take 1 tablet (3 mg) on T, R, Sat, Sun  Take 1.5 tablets (4.5 mg) on MWF, Needs Daily inr and goal of inr is 2-3, call MD daily for confirmed dosing (Patient taking differently: Take 3 mg by mouth See Admin Instructions. 7/1/19 INR 2.21 Cont 4.5mg M-W-F, 3mg AOD. Next INR 7/8.  6/28/19 INR 2.16 Take 4.5mg M, W, F and 3mg AOD Next INR 7/1  Take 1 tablet (3 mg) on T, R, Sat, Sun  Take 1.5 tablets (4.5 mg) on MWF, Needs Daily inr and goal of inr is 2-3, call MD daily for confirmed dosing      ) 40 tablet 0     No current facility-administered medications for this visit.      REVIEW OF SYSTEMS:    Currently, no fever, chills, or rigors. Does not have any visual or hearing problems. Denies any chest pain, headaches, palpitations, lightheadedness, dizziness, shortness of breath, or cough. Appetite is good. Denies any GERD symptoms. Denies any difficulty with swallowing, nausea, or vomiting.  Denies any abdominal pain, diarrhea or constipation. Denies any urinary symptoms. No insomnia. No active bleeding. No rash.     PHYSICAL EXAMINATION:  Vitals:    07/10/19 1644   BP: 125/66   Pulse: 87   Resp: 18   Temp: 97.9  F (36.6  C)   SpO2: 92%   Weight: 210 lb 14.4 oz (95.7 kg)       GENERAL: Awake, Alert, oriented x3, not in any form of acute distress, answers questions appropriately, follows simple commands, conversant  HEENT: Head is normocephalic with normal hair distribution. No evidence of trauma. Ears: No acute purulent discharge. Eyes:  Conjunctivae pink with no scleral jaundice. Nose: Normal mucosa and septum. NECK: Supple with no cervical or supraclavicular lymphadenopathy. Trachea is midline.   CHEST: No tenderness or deformity, no crepitus  LUNG: Clear to auscultation with good chest expansion. There are no crackles or wheezes, normal AP diameter.  BACK: No kyphosis of the thoracic spine. Symmetric, no curvature, ROM normal, no CVA tenderness, no spinal tenderness   CVS: There is good S1  S2, there are no murmurs, rubs, gallops, or heaves, rhythm is regular,  2+ pulses symmetric in all extremities.  ABDOMEN: Globular and soft, nontender to palpation, non distended, no masses, no organomegaly, good bowel sounds, no rebound or guarding, no peritoneal signs.   EXTREMITIES:  Full range of motion on both upper and lower extremities, there is no tenderness to palpation, bilateral lower extremity edema, no cyanosis or clubbing, no calf tenderness.  Pulses equal in all extremities, normal cap refill, no joint swelling.  SKIN: Warm and dry, no erythema noted.  Skin color, texture, no rashes or lesions.  NEUROLOGICAL: The patient is oriented to person, place and time. Strength and sensation are grossly intact. Face is symmetric.    LABS:      Lab Results   Component Value Date    WBC 7.8 07/03/2019    HGB 9.3 (L) 07/03/2019    HCT 31.7 (L) 07/03/2019    MCV 91 07/03/2019     07/03/2019     Results for orders placed or performed in visit on 07/08/19   Basic Metabolic Panel   Result Value Ref Range    Sodium 139 136 - 145 mmol/L    Potassium 4.3 3.5 - 5.0 mmol/L    Chloride 101 98 - 107 mmol/L    CO2 29 22 - 31 mmol/L    Anion Gap, Calculation 9 5 - 18 mmol/L    Glucose 101 70 - 125 mg/dL    Calcium 9.4 8.5 - 10.5 mg/dL    BUN 34 (H) 8 - 28 mg/dL    Creatinine 1.29 0.70 - 1.30 mg/dL    GFR MDRD Af Amer >60 >60 mL/min/1.73m2    GFR MDRD Non Af Amer 53 (L) >60 mL/min/1.73m2     Lab Results   Component Value Date    HGBA1C 6.9 (H) 06/22/2019      Vitamin D, Total (25-Hydroxy)   Date Value Ref Range Status   04/03/2017 6.6 (L) 30.0 - 80.0 ng/mL Final     Lab Results   Component Value Date    [id number] 274 04/03/2017     ASSESSMENT/PLAN:    1. Cellulitis of right upper extremity - Start Keflex 500 mg three times a day for 5 days and Lactobacillus three times a day   2. Anemia, unspecified type - Increase Ferrous sulfate 325 mg two times a day and start Ascorbic acid two times a day    3. Acute on chronic diastolic congestive heart failure (H) - No overt signs and symptoms fo decompensation will continue Lasix and Spironolactone, daily weights, and low sodium diet   4. Bilateral lower extremity edema  - Continue Lasix and start SAMANTA hose - on in am and off in pm   5. Persistent atrial fibrillation (H) - INR 2.40, give Coumadin 3 mg daily and check INR on 7/11/2019               Electronically signed by:  Dario Santillan CNP    Total time spent on the unit was 40 minutes of which 25 minutes was spent in counseling  Atrial Fibrillation and Coumadin dosing, Cellulitis and antibitoi therapy, and Anemia hose and coordination of care of the above plan with nursing staff, patient, and therapy.

## 2021-05-30 NOTE — PROGRESS NOTES
[facility]    NAME:  Modesto Martinez             :  1932  MRN: [id number]  CODE STATUS:  DNR    FACILITY:  [facility] [[id number]]       ROOM:   239    CHIEF COMPLAINT/REASON FOR VISIT:  Chief Complaint   Patient presents with     Problem Visit     CHF, DM2, Bilateral lower extremity edema     HISTORY OF PRESENT ILLNESS: Modesto Martinez is a 87 y.o. male with history of atrial fibrillation diastolic and systolic CHF and aortic valve replacement presents to [facility] as a direct admission from cardiology clinic due to inability to fully manage heart failure in the outpatient setting.      1.Acute combined systolic and diastolic CHF, NYHA class 1  -Cardiology following, appreciate input  -Has been on iv lasix at 80mg IV two times a day, spironolactone. Today cards just switched to po lasix at high dose of 80 mg two times a day, will stay on this per cards  -leg edema better, more so on left leg, so I did check u/son right-neg for dvt, not today right leg is softer  -Monitor I/O, daily weight , weight trending down and diuresing well  -will need close f/u with heart failure clinic after dc, such as in 3-5 days     2. Obstructive Sleep Apnea -continue CPAP     3. Type 2 diabetes mellitus with complication, without long-term current use of insulin (H) -continue home medications with sliding scale  -I will hold glip due to low bs, I think he will be checked with SS at TCU and diet control test. I think he has been running too tight     4.Anticoagulated on Coumadin -pharmacy to dose Coumadin  -daily inr     5.S/P aortic valve replacement with bioprosthetic valve      6.Persistent atrial fibrillation (H) -continue diltiazem,dig,warfarin     7.Ear lesion- has numerous scabs ,and he notes he picks at them at night. WOC did see      8.Anemia  -I am not sure if this is renal driven, no sign of loss, other than he scratches at scabs on face  -I called daughter, Katelynn, who  is a retired NP and discussed this. He has never had colonoscopy and would never agree  -there is a chance malignancy could play a factor here, daughter ok not working up     9.Scabs on face  -I did share my concern that some of the lesions maybe skin cancer  -he refuses to have anything removed, daughter aware     10.DVT prevent-coumadin   Patient was stabilized and transferred to TCU for continued rehabilitation.       Past Medical History:   Diagnosis Date     Aortic stenosis      Arthritis     knees     Atrial fibrillation (H)      CAD (coronary artery disease)      CHF (congestive heart failure) (H)      Diabetes mellitus, type II (H)      HTN (hypertension)      Hyperlipidemia     controlled on medications     KRISHAN (obstructive sleep apnea)     no CPAP     Past Surgical History:   Procedure Laterality Date     BACK SURGERY  1970    L5     CARDIAC VALVE SURGERY      cow valve     EYE SURGERY Bilateral     cataracts     Family History   Problem Relation Age of Onset     Cancer Mother      Diabetes Father      Social History     Socioeconomic History     Marital status:      Spouse name: Not on file     Number of children: Not on file     Years of education: Not on file     Highest education level: Not on file   Occupational History     Not on file   Social Needs     Financial resource strain: Not on file     Food insecurity:     Worry: Not on file     Inability: Not on file     Transportation needs:     Medical: Not on file     Non-medical: Not on file   Tobacco Use     Smoking status: Never Smoker     Smokeless tobacco: Never Used   Substance and Sexual Activity     Alcohol use: No     Drug use: No     Sexual activity: Not on file   Lifestyle     Physical activity:     Days per week: Not on file     Minutes per session: Not on file     Stress: Not on file   Relationships     Social connections:     Talks on phone: Not on file     Gets together: Not on file     Attends Hoahaoism service: Not on file      Active member of club or organization: Not on file     Attends meetings of clubs or organizations: Not on file     Relationship status: Not on file     Intimate partner violence:     Fear of current or ex partner: Not on file     Emotionally abused: Not on file     Physically abused: Not on file     Forced sexual activity: Not on file   Other Topics Concern     Not on file   Social History Narrative     Not on file     Allergies   Allergen Reactions     Levaquin [Levofloxacin]      Tendonitis     Current Outpatient Medications   Medication Sig Dispense Refill     acetaminophen (TYLENOL) 325 MG tablet Take 2 tablets (650 mg total) by mouth every 4 (four) hours as needed for pain or fever. 20 tablet 0     atorvastatin (LIPITOR) 40 MG tablet Take 1 tablet (40 mg total) by mouth at bedtime. lipids 30 tablet 0     cholecalciferol, vitamin D3, 1,000 unit tablet Take 1 tablet (1,000 Units total) by mouth at bedtime. General health 30 tablet 0     cyanocobalamin 1000 MCG tablet Take 1 tablet (1,000 mcg total) by mouth at bedtime. b12 def 30 tablet 0     digoxin (LANOXIN) 125 mcg tablet Take 1 tablet (125 mcg total) by mouth daily with supper. afib 30 tablet 0     diltiazem (CARDIZEM CD) 120 MG 24 hr capsule Take 1 capsule (120 mg total) by mouth every morning. afib 30 capsule 0     ferrous sulfate 325 (65 FE) MG tablet Take 1 tablet by mouth daily.       furosemide (LASIX) 80 MG tablet Take 1 tablet (80 mg total) by mouth 2 (two) times a day at 9am and 6pm. For chf 60 tablet 0     lansoprazole (PREVACID) 30 MG capsule Take 1 capsule (30 mg total) by mouth every morning. gerd 30 capsule 0     NOVOLOG FLEXPEN U-100 INSULIN 100 unit/mL (3 mL) injection pen Check blood sugar three (3) times daily.  11.9 Type 2 without complications  BD Ultra-fine Shalonda Pen Needles - NDC [address]-61 - dispense 1 case, refill PRN for 1 year  Microlet Color Lancets (100s) - dispense 1, refill PRN for 1 year 5 Pre-filled Pen Syringe PRN      spironolactone (ALDACTONE) 25 MG tablet Take 0.5 tablets (12.5 mg total) by mouth every morning. chf 30 tablet 0     warfarin (COUMADIN/JANTOVEN) 3 MG tablet Take 1 tablet (3 mg total) by mouth See Admin Instructions. Take 1 tablet (3 mg) on T, R, Sat, Sun  Take 1.5 tablets (4.5 mg) on MWF, Needs Daily inr and goal of inr is 2-3, call MD daily for confirmed dosing (Patient taking differently: Take 3 mg by mouth See Admin Instructions. 7/1/19 INR 2.21 Cont 4.5mg M-W-F, 3mg AOD. Next INR 7/8.  6/28/19 INR 2.16 Take 4.5mg M, W, F and 3mg AOD Next INR 7/1  Take 1 tablet (3 mg) on T, R, Sat, Sun  Take 1.5 tablets (4.5 mg) on MWF, Needs Daily inr and goal of inr is 2-3, call MD daily for confirmed dosing      ) 40 tablet 0     No current facility-administered medications for this visit.      REVIEW OF SYSTEMS:    Currently, no fever, chills, or rigors. Does not have any visual or hearing problems. Denies any chest pain, headaches, palpitations, lightheadedness, dizziness, shortness of breath, or cough. Appetite is good. Denies any GERD symptoms. Denies any difficulty with swallowing, nausea, or vomiting.  Denies any abdominal pain, diarrhea or constipation. Denies any urinary symptoms. No insomnia. No active bleeding. No rash.     PHYSICAL EXAMINATION:  Vitals:    07/03/19 2148   BP: 131/57   Pulse: 82   Resp: 14   Temp: 98  F (36.7  C)   SpO2: 94%   Weight: 208 lb 11.2 oz (94.7 kg)       GENERAL: Awake, Alert, oriented x3, not in any form of acute distress, answers questions appropriately, follows simple commands, conversant  HEENT: Head is normocephalic with normal hair distribution. No evidence of trauma. Ears: No acute purulent discharge. Eyes: Conjunctivae pink with no scleral jaundice. Nose: Normal mucosa and septum. NECK: Supple with no cervical or supraclavicular lymphadenopathy. Trachea is midline.   CHEST: No tenderness or deformity, no crepitus  LUNG: Clear to auscultation with good chest expansion. There are  no crackles or wheezes, normal AP diameter.  BACK: No kyphosis of the thoracic spine. Symmetric, no curvature, ROM normal, no CVA tenderness, no spinal tenderness   CVS: There is good S1  S2, there are no murmurs, rubs, gallops, or heaves, rhythm is regular,  2+ pulses symmetric in all extremities.  ABDOMEN: Globular and soft, nontender to palpation, non distended, no masses, no organomegaly, good bowel sounds, no rebound or guarding, no peritoneal signs.   EXTREMITIES:  Full range of motion on both upper and lower extremities, there is no tenderness to palpation, no pedal edema, no cyanosis or clubbing, no calf tenderness.  Pulses equal in all extremities, normal cap refill, no joint swelling.  SKIN: Warm and dry, no erythema noted.  Skin color, texture, no rashes or lesions.  NEUROLOGICAL: The patient is oriented to person, place and time. Strength and sensation are grossly intact. Face is symmetric.    LABS:      Lab Results   Component Value Date    WBC 7.8 07/03/2019    HGB 9.3 (L) 07/03/2019    HCT 31.7 (L) 07/03/2019    MCV 91 07/03/2019     07/03/2019     Results for orders placed or performed in visit on 06/29/19   Basic Metabolic Panel   Result Value Ref Range    Sodium 137 136 - 145 mmol/L    Potassium 3.9 3.5 - 5.0 mmol/L    Chloride 96 (L) 98 - 107 mmol/L    CO2 35 (H) 22 - 31 mmol/L    Anion Gap, Calculation 6 5 - 18 mmol/L    Glucose 114 70 - 125 mg/dL    Calcium 9.2 8.5 - 10.5 mg/dL    BUN 33 (H) 8 - 28 mg/dL    Creatinine 1.39 (H) 0.70 - 1.30 mg/dL    GFR MDRD Af Amer 59 (L) >60 mL/min/1.73m2    GFR MDRD Non Af Amer 48 (L) >60 mL/min/1.73m2     Lab Results   Component Value Date    HGBA1C 6.9 (H) 06/22/2019     Vitamin D, Total (25-Hydroxy)   Date Value Ref Range Status   04/03/2017 6.6 (L) 30.0 - 80.0 ng/mL Final     Lab Results   Component Value Date    [id number] 274 04/03/2017     ASSESSMENT/PLAN:    1. Acute on chronic diastolic congestive heart failure (H) - Continue Lasix and  Spironolactone, daily weights, and low sodium diet   2. Type 2 diabetes mellitus with complication, without long-term current use of insulin (H) - Start Glipizide 5 mg daily and titrate off SSI.  Will add Trandjenta at a later time   3. Bilateral lower extremity edema - Continue Lasix and start SAMANTA hose - on in am and off in pm   4. KRISHAN (obstructive sleep apnea) - Continue CPAP   5. Persistent atrial fibrillation (H) - INR 2.2, give Coumadin 4.5 mg MWF and 3 mg rest of week.  Will check INR on 7/8/2019         Electronically signed by:  Dario Santillan CNP    Total time spent on the unit was 40 minutes of which 25 minutes was spent in counseling  Diabetes Mellitus type 2/Medications/Diet, CHF/Medication/SAMANTA hose and coordination of care of the above plan with nursing staff, patient, and therapy.

## 2021-05-30 NOTE — PROGRESS NOTES
[facility]        Visit Type: H & P (Acute CHF)    Code Status:  DNR  Facility:  [facility] [[id number]]          PCP: Rik Lobato MD       PHONE: [phone]     FAX:[phone]        ASSESSMENT/PLAN:  1. Acute on chronic diastolic congestive heart failure (H)   now stable, will continue Lasix 80 mg twice daily and spinal lactone 12.5 mg daily.  Continue monitoring weights, low-sodium diet.  Recheck BMP on 7/8.  Follow-up with heart failure clinic early next week.   2. Type 2 diabetes mellitus with complication, without long-term current use of insulin (H)   blood sugars with range 154-308 and so glipizide restarted at 5 mg daily (at home, the patient was on glipizide XL 10 mg and linagliptin tied 5 mg daily)    3. Persistent atrial fibrillation (H)   continue diltiazem, digoxin for rate control.  Await next INR on 7/8   4. Other iron deficiency anemia   hemoglobin low but stable, will monitor.  No evidence for bleeding at this time   5. Chronic renal disease, stage III (H)   GFR is 48, will monitor since on high-dose Lasix   6. S/P aortic valve replacement with bioprosthetic valve   stable   7. Excoriation of multiple sites   symptomatic treatment, patient refuses further intervention or evaluation         HISTORY OF PRESENT ILLNESS:   Modesto Martinez is a 87 y.o. male with a history of type 2 diabetes, atrial fibrillation on Coumadin, history of AVR with bioprosthetic valve, KRISHAN on CPAP, CHF who was admitted from the heart failure clinic because of failure of outpatient management.  The patient was said to have acute on chronic diastolic and Starlix CHF and was initially placed on IV Lasix, spironolactone and transitioned to p.o. Lasix 80 mg twice daily and spironolactone at 12.5 mg in a.m.  He also was noted to have low blood sugars and his glipizide and linagliptide were held.  His atrial fibrillation was controlled on digoxin and and he is  on Coumadin for anticoagulation.  He was also anemic in the hospital with a hemoglobin of 9.0 but the patient and his daughter did not agree on further evaluation with colonoscopy.  He also has multiple scabs on his face and arms but again he refused any further evaluation for this.    Currently, the patient states that he is doing much better and he denies any increasing shortness of breath.  He states that that he usually gets short of breath with long walks which has not changed.  He denies any chest pains or palpitations.  He does not have any dizziness or lightheadedness.  His appetite is good and he has been sleeping better without any problems.  He states that he does not have any further complaints.  He states that he is aware that he has multiple scabs but he states that this has been ongoing for a while and that he has lived with it without any problems.  He denies any weakness.  Of note is that his hemoglobin in the hospital is at 9.0 with a repeat at 9.3 on 7/1/2019.  His current INR is at 2.21.  At home he takes 3 mg of Coumadin on Tuesdays, Thursdays, Saturdays, Sundays and 4.5 mg MWF.  He is also on diltiazem and digoxin.  His GFR has been stable at 48 with a creatinine of 1.39 and BUN of 33.    Other review of systems are negative.          PAST MEDICAL/SURGICAL HISTORY:  Past Medical History:   Diagnosis Date     Aortic stenosis      Arthritis     knees     Atrial fibrillation (H)      CAD (coronary artery disease)      CHF (congestive heart failure) (H)      Diabetes mellitus, type II (H)      HTN (hypertension)      Hyperlipidemia     controlled on medications     KRISHAN (obstructive sleep apnea)     no CPAP     Past Surgical History:   Procedure Laterality Date     BACK SURGERY  1970    L5     CARDIAC VALVE SURGERY      cow valve     EYE SURGERY Bilateral     cataracts       SOCIAL HISTORY:  Social History     Socioeconomic History     Marital status:      Spouse name: Not on file     Number  of children: Not on file     Years of education: Not on file     Highest education level: Not on file   Occupational History     Not on file   Social Needs     Financial resource strain: Not on file     Food insecurity:     Worry: Not on file     Inability: Not on file     Transportation needs:     Medical: Not on file     Non-medical: Not on file   Tobacco Use     Smoking status: Never Smoker     Smokeless tobacco: Never Used   Substance and Sexual Activity     Alcohol use: No     Drug use: No     Sexual activity: Not on file   Lifestyle     Physical activity:     Days per week: Not on file     Minutes per session: Not on file     Stress: Not on file   Relationships     Social connections:     Talks on phone: Not on file     Gets together: Not on file     Attends [employer] service: Not on file     Active member of club or organization: Not on file     Attends meetings of clubs or organizations: Not on file     Relationship status: Not on file     Intimate partner violence:     Fear of current or ex partner: Not on file     Emotionally abused: Not on file     Physically abused: Not on file     Forced sexual activity: Not on file   Other Topics Concern     Not on file   Social History Narrative     Not on file       FAMILY HISTORY:  Family History   Problem Relation Age of Onset     Cancer Mother      Diabetes Father        MEDICATIONS:  Current Outpatient Medications on File Prior to Visit   Medication Sig     acetaminophen (TYLENOL) 325 MG tablet Take 2 tablets (650 mg total) by mouth every 4 (four) hours as needed for pain or fever.     atorvastatin (LIPITOR) 40 MG tablet Take 1 tablet (40 mg total) by mouth at bedtime. lipids     cholecalciferol, vitamin D3, 1,000 unit tablet Take 1 tablet (1,000 Units total) by mouth at bedtime. General health     cyanocobalamin 1000 MCG tablet Take 1 tablet (1,000 mcg total) by mouth at bedtime. b12 def     digoxin (LANOXIN) 125 mcg tablet Take 1 tablet (125 mcg total) by mouth  daily with supper. afib     diltiazem (CARDIZEM CD) 120 MG 24 hr capsule Take 1 capsule (120 mg total) by mouth every morning. afib     furosemide (LASIX) 80 MG tablet Take 1 tablet (80 mg total) by mouth 2 (two) times a day at 9am and 6pm. For chf     lansoprazole (PREVACID) 30 MG capsule Take 1 capsule (30 mg total) by mouth every morning. gerd     NOVOLOG FLEXPEN U-100 INSULIN 100 unit/mL (3 mL) injection pen Check blood sugar three (3) times daily.  11.9 Type 2 without complications  BD Ultra-fine Shalonda Pen Needles - NDC [address]-28 - dispense 1 case, refill PRN for 1 year  Microlet Color Lancets (100s) - dispense 1, refill PRN for 1 year     spironolactone (ALDACTONE) 25 MG tablet Take 0.5 tablets (12.5 mg total) by mouth every morning. chf     warfarin (COUMADIN/JANTOVEN) 3 MG tablet Take 1 tablet (3 mg total) by mouth See Admin Instructions. Take 1 tablet (3 mg) on T, R, Sat, Sun  Take 1.5 tablets (4.5 mg) on MWF, Needs Daily inr and goal of inr is 2-3, call MD daily for confirmed dosing (Patient taking differently: Take 3 mg by mouth See Admin Instructions. 7/1/19 INR 2.21 Cont 4.5mg M-W-F, 3mg AOD. Next INR 7/8.  6/28/19 INR 2.16 Take 4.5mg M, W, F and 3mg AOD Next INR 7/1  Take 1 tablet (3 mg) on T, R, Sat, Sun  Take 1.5 tablets (4.5 mg) on MWF, Needs Daily inr and goal of inr is 2-3, call MD daily for confirmed dosing      )     No current facility-administered medications on file prior to visit.        ALLERGIES:  Allergies   Allergen Reactions     Levaquin [Levofloxacin]      Tendonitis         PHYSICAL EXAMINATION:  Vital signs 131/52, 97.7, 83, 27.6 pounds, 14, 93% room air  General: Awake, Alert, oriented x3, not in any form of acute distress, answers questions appropriately, follows simple commands, says he wants to be mostly left alone  HEENT: Pink conjunctiva, anicteric sclerae, oral mucosa is moist  NECK: Supple, without any lymphadenopathy, thyromegaly or any masses  LUNG: Clear to  auscultation, good chest expansion. There are no crackles, no wheezes, normal AP diameter  BACK: No kyphosis of the thoracic spine  CVS: There is good S1  S2, there are no murmurs, no heaves, rhythm is regularly irregular  ABDOMEN: Globular and soft, nontender to palpation, no organomegaly, good bowel sounds  EXTREMITIES: Good range of motion on both upper and lower extremities, 2+ bilateral pedal edema, no cyanosis or clubbing, no calf tenderness  SKIN: Warm and dry, audible scabs noted on the face, ears, arms and legs    LABS:  Lab Results   Component Value Date    WBC 7.8 07/03/2019    HGB 9.3 (L) 07/03/2019    HCT 31.7 (L) 07/03/2019    MCV 91 07/03/2019     07/03/2019     Lab Results   Component Value Date    CREATININE 1.39 (H) 06/29/2019    BUN 33 (H) 06/29/2019     06/29/2019    K 3.9 06/29/2019    CL 96 (L) 06/29/2019    CO2 35 (H) 06/29/2019           >45 minutes of total time spent, greater than 55% of the time spent in coordination of care and counseling regarding the above medical issues and plan of care including discussion of generalized excoriation, current labs with decreased GFR and decreased hemoglobin and platelets, anticoagulation with Coumadin and dosage, uncontrolled diabetes. I have reviewed the patient's medical records, labs and medications.       Electronically signed by:Tere Stallworth MD

## 2021-05-31 VITALS — HEIGHT: 73 IN | BODY MASS INDEX: 31.54 KG/M2 | WEIGHT: 238 LBS

## 2021-05-31 NOTE — PROGRESS NOTES
[facility]    Facility:   [facility] [[id number]]   Code Status: DNR        Admission History and Physical   Modesto Martinez,  1932, MRN [id number]    PCP: Rik Lobato MD, [phone]    Date of Service   Code status:  [unfilled]       Extended Emergency Contact Information  Primary Emergency Contact: Katelynn Hutchinson   [facility]  Home Phone: [phone]  Mobile Phone: [phone]  Relation: Child  Secondary Emergency Contact: Zachary Martinez   [facility]  Home Phone: [phone]  Relation: Child       Assessment and Plan         1. Recent  Hospitalization  on 19 at [facility] and discharged on 19. He was admitted with recurrent falls which felt likely due to deconditioning and dehydration from high dose of lasix. No other abnormality was found on examination. Pt/ot evaluation was done and recommendations were for TCU.    2. B12 deficiency : on supplement    3. Chronic CHF : on lasix 40 mg two times a day, spironolactone     4. HLD : on statin    5. A fib, h/o : on digoxin, cardizem, coumadin    6. Anemia, chronic : on iron tab    7. DM2 : on glipizide 5 mg daily    8. GERD : on PPI          Disposition/Plan :            Patient is a 87 yr old male. H/o Chronic CHF, s/p aortic valve, a fib. He was recently hospitalized on 19 at [facility] and discharged on 19.  He was admitted with recurrent falls which felt likely due to deconditioning and dehydration from high dose of lasix. No other abnormality was found on examination. Pt/ot evaluation was done and recommendations were for TCU.    Patient was then subsequently admitted at [facility] for rehab, pt and ot. Patient is medically stable at this time. He denies any significant complaints at this time.           Chief Complaint:  falls     History of Present Illness         Patient is a 87 yr old male. H/o Chronic CHF, s/p aortic valve, a fib. He was recently  hospitalized on 08/02/19 at [facility] and discharged on 08/05/19.  He was admitted with recurrent falls which felt likely due to deconditioning and dehydration from high dose of lasix. No other abnormality was found on examination. Pt/ot evaluation was done and recommendations were for TCU.    Patient was then subsequently admitted at [facility] for rehab, pt and ot. Patient is medically stable at this time. He denies any significant complaints at this time.        No h/o fever or chills  No cardiorespiratory symptoms  No abdominal symptoms  No urinary symptoms  No neuro symptoms.       Medical History  Active Ambulatory (Non-Hospital) Problems    Diagnosis     ACP (advance care planning)     Frequent falls     TIA (transient ischemic attack)     Dizziness     Acute combined systolic and diastolic CHF, NYHA class 1 (H)     Acute exacerbation of CHF (congestive heart failure) (H)     CHF exacerbation (H)     Melena     Syncope     Acute blood loss anemia     Chronic diastolic congestive heart failure (H)     Persistent atrial fibrillation (H)     Anticoagulated on Coumadin     S/P aortic valve replacement with bioprosthetic valve     Obstructive Sleep Apnea     Hypertension     Coronary Artery Disease     Aortic Stenosis     Atrial flutter (H)     Acute Combined Systolic And Diastolic Congestive Heart Failure     Type 2 diabetes mellitus with complication, without long-term current use of insulin (H)     Obesity     Past Medical History:   Diagnosis Date     Aortic stenosis      Arthritis      Atrial fibrillation (H)      CAD (coronary artery disease)      CHF (congestive heart failure) (H)      Diabetes mellitus, type II (H)      HTN (hypertension)      Hyperlipidemia      KRISHAN (obstructive sleep apnea)         Surgical History  He  has a past surgical history that includes Back surgery (1970); Eye surgery (Bilateral); and Cardiac valuve replacement.       Social History  Reviewed, and he  reports that he has  never smoked. He has never used smokeless tobacco. He reports that he does not drink alcohol or use drugs.     Allergies  Allergies   Allergen Reactions     Levaquin [Levofloxacin]      Tendonitis    Family History  Reviewed, and family history includes Cancer in his mother; Diabetes in his father.          Prior to Admission Medications     (Not in a hospital admission)       Review of Systems:  A 12 point comprehensive review of systems was negative except as noted. Physical Exam:      HEENT : no distended veins, no lymphadenopathy, thyroid is normal  LUNGS : b/l air entry present, no significant crackles/wheezing.  ABDOMEN : soft, non-tender, non-distended, BS present.  HEART :  Regular rate & rhythm, S1 & S2 normal, no murmur, clicks/rubs, no ankle edema,  NEURO : conscious, oriented, responds to commands, no obvious focal deficit.  MUSCULOSKELETAL : EXTREMITIES/BACK :  no calf tenderness.  SKIN : no rashes      Vitals:    08/26/19 1706   BP: 134/55   Pulse: 88   Resp: 22   Temp: 98.2  F (36.8  C)   SpO2: 92%                    Pertinent Labs      Lab Results: personally reviewed.   Lab Results   Component Value Date     08/12/2019    K 4.1 08/12/2019     08/12/2019    CO2 31 08/12/2019    BUN 30 (H) 08/12/2019    CREATININE 1.09 08/12/2019    CALCIUM 9.3 08/12/2019       Pertinent Radiology      Radiology Results: Personally reviewed image/s  EKG Results:    Advanced Care Planning             Total Time Spent > 45 mins.  Discharge Planning discussed with Patient and Family  Discussed care with Patient for 35  Minutes and greater than 50% of total time spent in coordinating the plan of care.             Electronically signed by: Joe Dee MD

## 2021-05-31 NOTE — PROGRESS NOTES
[facility]    NAME:  Modesto Martinez             :  1932  MRN: [id number]  CODE STATUS:  DNR    FACILITY:  [facility] [[id number]]       ROOM:   246    CHIEF COMPLAINT/REASON FOR VISIT:  Chief Complaint   Patient presents with     Problem Visit     Dehydration and recurrent falls      HISTORY OF PRESENT ILLNESS: Modesto Martinez is a 87 y.o. male with HFpEF, AS s/p bioprosthetic valve/afib on coumadin/CKDIII, recently dc'ed  for TIA and a month before that for HFpEF presented from home s/p recurrent falls.  Recurrent falls, multifactorial likely due to physical deconditioning and dehydration from high dose of Lasix.  2 falls in 24hours prior to admission. Per pt and daughter, likely mechanical. Issues with balance and generalized weakness and muslce loss. Tripped and fell. No LOC. Per CT head and CT cervical spine negative.  EKG- afib rate controlled/TNI negative  -No electrolyte abnormalities to explain rapid onset of weakness  -No myalgia or muscle stiffness to suspect polymyalgia rheumatica, dermatomyositis  -daughter would like the pt to be continued on coumadin and aware of the bleeding risks  -Unable to obtain orthostatic vitals, as patient unable to stand up long enough to check BP due to weakness  -PT/OT recommending TCU  - tele monitoring-no cardiac arrhythmias  -Suspect overdiuresis and contributing, his current weight 195 pounds, patient daughter states he has never been less than 200 pounds, and weight found down to 195 pounds during this hospitalization.  BUN 46, indicating dehydration.  Patient very high dose 80 mg twice a day of Lasix.   -Decrease Lasix 60 mg twice a day, check orthostatic vitals, renal numbers improved.  -Further decrease Lasix dose to 40 mg twice a day and discharge.  Continue with daily weights at TCU.     HFpEF: stable. Appears euvolemic. B/l LE edema seems chronic.   - c/w spironolactone and Lasix at lower dose as  above     IDDMII:resume glipizide.      Afib: c/w digoxin and diltiazem.  Digoxin level pending on the day of discharge.     GERD: PPI     TIA: coumadin/statin     Dyslipidemia: statin     Diabetic/cardiac diet  Patient was stabilized and transferred to TCU for continued rehabilitation.    Past Medical History:   Diagnosis Date     Aortic stenosis      Arthritis     knees     Atrial fibrillation (H)      CAD (coronary artery disease)      CHF (congestive heart failure) (H)      Diabetes mellitus, type II (H)      HTN (hypertension)      Hyperlipidemia     controlled on medications     KRISHAN (obstructive sleep apnea)     no CPAP     Past Surgical History:   Procedure Laterality Date     BACK SURGERY  1970    L5     CARDIAC VALVE SURGERY      cow valve     EYE SURGERY Bilateral     cataracts     Family History   Problem Relation Age of Onset     Cancer Mother      Diabetes Father      Social History     Socioeconomic History     Marital status:      Spouse name: Not on file     Number of children: Not on file     Years of education: Not on file     Highest education level: Not on file   Occupational History     Not on file   Social Needs     Financial resource strain: Not on file     Food insecurity:     Worry: Not on file     Inability: Not on file     Transportation needs:     Medical: Not on file     Non-medical: Not on file   Tobacco Use     Smoking status: Never Smoker     Smokeless tobacco: Never Used   Substance and Sexual Activity     Alcohol use: No     Drug use: No     Sexual activity: Not on file   Lifestyle     Physical activity:     Days per week: Not on file     Minutes per session: Not on file     Stress: Not on file   Relationships     Social connections:     Talks on phone: Not on file     Gets together: Not on file     Attends Restorationist service: Not on file     Active member of club or organization: Not on file     Attends meetings of clubs or organizations: Not on file     Relationship status:  Not on file     Intimate partner violence:     Fear of current or ex partner: Not on file     Emotionally abused: Not on file     Physically abused: Not on file     Forced sexual activity: Not on file   Other Topics Concern     Not on file   Social History Narrative     Not on file     Allergies   Allergen Reactions     Levaquin [Levofloxacin]      Tendonitis     Current Outpatient Medications   Medication Sig Dispense Refill     acetaminophen (TYLENOL) 325 MG tablet Take 2 tablets (650 mg total) by mouth every 4 (four) hours as needed for pain or fever. 20 tablet 0     atorvastatin (LIPITOR) 40 MG tablet Take 1 tablet (40 mg total) by mouth at bedtime. lipids 30 tablet 0     cholecalciferol, vitamin D3, 1,000 unit tablet Take 1 tablet (1,000 Units total) by mouth at bedtime. General health (Patient taking differently: Take 1,000 Units by mouth daily. General health      ) 30 tablet 0     cyanocobalamin 1000 MCG tablet Take 1 tablet (1,000 mcg total) by mouth at bedtime. b12 def (Patient taking differently: Take 1,000 mcg by mouth daily. b12 def      ) 30 tablet 0     digoxin (LANOXIN) 125 mcg tablet Take 1 tablet (125 mcg total) by mouth daily with supper. afib 30 tablet 0     diltiazem (CARDIZEM CD) 120 MG 24 hr capsule Take 1 capsule (120 mg total) by mouth every morning. afib 30 capsule 0     ferrous sulfate 325 (65 FE) MG tablet Take 1 tablet by mouth 2 (two) times a day.              furosemide (LASIX) 40 MG tablet Take 1 tablet (40 mg total) by mouth 2 (two) times a day at 9am and 6pm. 60 tablet 0     glipiZIDE (GLUCOTROL) 5 MG tablet Take 5 mg by mouth daily with breakfast.       lansoprazole (PREVACID) 30 MG capsule Take 1 capsule (30 mg total) by mouth every morning. gerd 30 capsule 0     nitroglycerin (NITROSTAT) 0.4 MG SL tablet Place 1 tablet (0.4 mg total) under the tongue every 5 (five) minutes as needed for chest pain. 30 tablet 0     senna-docusate (PERICOLACE) 8.6-50 mg tablet Take 1 tablet by  mouth 2 (two) times a day as needed for constipation.              spironolactone (ALDACTONE) 25 MG tablet Take 0.5 tablets (12.5 mg total) by mouth every morning. chf 30 tablet 0     warfarin (COUMADIN/JANTOVEN) 3 MG tablet Take 3-4.5 mg by mouth See Admin Instructions. 8/19/19 INR 2.86 Cont 3mg M-W-F, 4.5mg AOD. Next INR 8/26.  8/6/19 INR 2.28 3mg M, W, F and 4.5mg AOD. Next INR 8/8  4.5 mg on T, Th, Sa, Sun  3 mg on MWF             No current facility-administered medications for this visit.        REVIEW OF SYSTEMS:    Currently, no fever, chills, or rigors. Does not have any visual or hearing problems. Denies any chest pain, headaches, palpitations, lightheadedness, dizziness, shortness of breath, or cough. Appetite is good. Denies any GERD symptoms. Denies any difficulty with swallowing, nausea, or vomiting.  Denies any abdominal pain, diarrhea or constipation. Denies any urinary symptoms. No insomnia. No active bleeding. No rash.       PHYSICAL EXAMINATION:  Vitals:    08/19/19 2330   BP: 139/57   Pulse: 85   Resp: 22   Temp: 98  F (36.7  C)   SpO2: 90%   Weight: 198 lb 12.8 oz (90.2 kg)       GENERAL: Awake, Alert, oriented x3, not in any form of acute distress, answers questions appropriately, follows simple commands, conversant  HEENT: Head is normocephalic with normal hair distribution. No evidence of trauma. Ears: No acute purulent discharge. Eyes: Conjunctivae pink with no scleral jaundice. Nose: Normal mucosa and septum. NECK: Supple with no cervical or supraclavicular lymphadenopathy. Trachea is midline.   CHEST: No tenderness or deformity, no crepitus  LUNG: Clear to auscultation with good chest expansion. There are no crackles or wheezes, normal AP diameter.  BACK: No kyphosis of the thoracic spine. Symmetric, no curvature, ROM normal, no CVA tenderness, no spinal tenderness   CVS: There is good S1  S2, there are no murmurs, rubs, gallops, or heaves, rhythm is regular,  2+ pulses symmetric in all  extremities.  ABDOMEN: Globular and soft, nontender to palpation, non distended, no masses, no organomegaly, good bowel sounds, no rebound or guarding, no peritoneal signs.   EXTREMITIES:  Full range of motion on both upper and lower extremities, there is no tenderness to palpation, no pedal edema, no cyanosis or clubbing, no calf tenderness.  Pulses equal in all extremities, normal cap refill, no joint swelling.  SKIN: Warm and dry, no erythema noted.  Skin color, texture, no rashes or lesions.  NEUROLOGICAL: The patient is oriented to person, place and time. Strength and sensation are grossly intact. Face is symmetric.    LABS:      Lab Results   Component Value Date    WBC 6.2 08/12/2019    HGB 9.4 (L) 08/12/2019    HCT 31.1 (L) 08/12/2019    MCV 91 08/12/2019     08/12/2019     Results for orders placed or performed in visit on 08/12/19   Basic Metabolic Panel   Result Value Ref Range    Sodium 141 136 - 145 mmol/L    Potassium 4.1 3.5 - 5.0 mmol/L    Chloride 103 98 - 107 mmol/L    CO2 31 22 - 31 mmol/L    Anion Gap, Calculation 7 5 - 18 mmol/L    Glucose 111 70 - 125 mg/dL    Calcium 9.3 8.5 - 10.5 mg/dL    BUN 30 (H) 8 - 28 mg/dL    Creatinine 1.09 0.70 - 1.30 mg/dL    GFR MDRD Af Amer >60 >60 mL/min/1.73m2    GFR MDRD Non Af Amer >60 >60 mL/min/1.73m2     Lab Results   Component Value Date    HGBA1C 6.9 (H) 06/22/2019     Vitamin D, Total (25-Hydroxy)   Date Value Ref Range Status   04/03/2017 6.6 (L) 30.0 - 80.0 ng/mL Final     Lab Results   Component Value Date    [id number] >2,000 (H) 08/02/2019       ASSESSMENT/PLAN:    1. Acute on chronic diastolic congestive heart failure (H) - No overt signs or symptoms of decompensation. Will continue Spironolactone and decreased dose of Lasix    2. Persistent atrial fibrillation (H) - Anticoagulated with Coumadin   3. Frequent falls - No recent falls, continue PT/OT   4. Type 2 diabetes mellitus with complication, without long-term current use of insulin (H)     5. Bilateral lower extremity edema - Chronic, see above   6. Dehydration - Improved, see above         Electronically signed by:  Dario Santillan CNP    Total time spent on the unit was 40 minutes of which 25 minutes was spent in counseling Safety precautions to prevent falls and Coumadin and coordination of care of the above plan with nursing staff, patient, and therapy.

## 2021-05-31 NOTE — TELEPHONE ENCOUNTER
[facility] Nurse Triage Anticoagulation Note      Provider: TYRESE Diamond  Facility: [facility]    Facility Type: TCU    Caller: Geo   Call Back Number:  [phone]    Reason for call: INR    Today s INR: 2.28  Previous INR: 8/5 2.17 3mg, 8/4 1.91 6mg    Diagnosis/Goal: AFIB and Tissue/Bio Prosthetic Valve  Heparin/Lovenox: No   Currently on ABX: No  Other interacting Medications: None  Missed or refused doses: No    Verbal Order/Direction given by Provider: 3mg M, W, F and 4.5mg AOD. Next INR 8/8    Provider giving order: TYRESE Washburn    Verbal order given to: Geo Graves RN

## 2021-05-31 NOTE — PROGRESS NOTES
[facility]      NAME:  Modesto Martinez             :  1932    MRN: [id number]    CODE STATUS:      FACILITY: [facility] [[id number]]         CHIEF COMPLAIN/REASON FOR VISIT:  Chief Complaint   Patient presents with     Review Of Multiple Medical Conditions       HISTORY OF PRESENT ILLNESS: Modesto Martinez is a 87 y.o. male being seen for review of multiple medical conditions.  He comes to the TCU from [facility] where he was admitted from  to 2019  Due to frequently falling at home.Upon  Admission he was dehydrated and felt it was due to his high doses of diuretic.  PMH also includes : Afib, valve rplacement, and TIA. He is seen in room up in . We discussed TCU stay, he reports he is familiar with routines. He has bilateral leg edema, wears home compression socks, REFUSES teds. On coumadin , no excessive bleeding bruising or bleeding.    Allergies   Allergen Reactions     Levaquin [Levofloxacin]      Tendonitis   :     Current Outpatient Medications   Medication Sig     acetaminophen (TYLENOL) 325 MG tablet Take 2 tablets (650 mg total) by mouth every 4 (four) hours as needed for pain or fever.     atorvastatin (LIPITOR) 40 MG tablet Take 1 tablet (40 mg total) by mouth at bedtime. lipids     cholecalciferol, vitamin D3, 1,000 unit tablet Take 1 tablet (1,000 Units total) by mouth at bedtime. General health (Patient taking differently: Take 1,000 Units by mouth daily. General health      )     cyanocobalamin 1000 MCG tablet Take 1 tablet (1,000 mcg total) by mouth at bedtime. b12 def (Patient taking differently: Take 1,000 mcg by mouth daily. b12 def      )     digoxin (LANOXIN) 125 mcg tablet Take 1 tablet (125 mcg total) by mouth daily with supper. afib     diltiazem (CARDIZEM CD) 120 MG 24 hr capsule Take 1 capsule (120 mg total) by mouth every morning. afib     ferrous sulfate 325 (65 FE) MG tablet Take 1 tablet by mouth 2 (two) times a  day.            furosemide (LASIX) 40 MG tablet Take 1 tablet (40 mg total) by mouth 2 (two) times a day at 9am and 6pm.     glipiZIDE (GLUCOTROL) 5 MG tablet Take 5 mg by mouth daily with breakfast.     lansoprazole (PREVACID) 30 MG capsule Take 1 capsule (30 mg total) by mouth every morning. gerd     nitroglycerin (NITROSTAT) 0.4 MG SL tablet Place 1 tablet (0.4 mg total) under the tongue every 5 (five) minutes as needed for chest pain.     senna-docusate (PERICOLACE) 8.6-50 mg tablet Take 1 tablet by mouth 2 (two) times a day as needed for constipation.            spironolactone (ALDACTONE) 25 MG tablet Take 0.5 tablets (12.5 mg total) by mouth every morning. chf     warfarin (COUMADIN/JANTOVEN) 3 MG tablet Take 3-4.5 mg by mouth See Admin Instructions. 8/6/19 INR 2.28 3mg M, W, F and 4.5mg AOD. Next INR 8/8  4.5 mg on T, Th, Sa, Sun  3 mg on MWF               REVIEW OF SYSTEMS:    Currently, no fever, chills, or rigors. Does not have any visual problems,  hearing problems uses HA. Denies any chest pain, headaches, palpitations, lightheadedness, dizziness, shortness of breath, or cough. Appetite is good. Denies any GERD symptoms. Denies any difficulty with swallowing, nausea, or vomiting.  Denies any abdominal pain, diarrhea or constipation. Denies any urinary symptoms. No insomnia. No active bleeding. No rash.       PHYSICAL EXAMINATION:  Vitals:    08/09/19 0506   BP: 125/53   Pulse: 82   Temp: 98.2  F (36.8  C)   Weight: 198 lb 6.4 oz (90 kg)         GENERAL: Awake, Alert, oriented x3, not in any form of acute distress, answers questions appropriately, follows simple commands, conversant  HEENT: Head is normocephalic with normal hair distribution. No evidence of trauma. Ears: No acute purulent discharge. Eyes: Conjunctivae pink with no scleral jaundice. Nose: Normal mucosa and septum. NECK: Supple with no cervical or supraclavicular lymphadenopathy. Trachea is midline.   CHEST: No tenderness or deformity, no  crepitus  LUNG: Clear to auscultation with good chest expansion. There are no crackles or wheezes, normal AP diameter.  BACK: No kyphosis of the thoracic spine. Symmetric, no curvature, ROM normal, no CVA tenderness, no spinal tenderness   CVS: There is good S1  S2, rhythm is regular.  ABDOMEN: Globular and soft, nontender to palpation, non distended, no masses, no organomegaly, good bowel sounds, no rebound or guarding, no peritoneal signs.   EXTREMITIES: Atraumatic. Full range of motion on both upper and lower extremities, there is no tenderness to palpation, + for  pedal edema, no cyanosis or clubbing, no calf tenderness, normal cap refill, aged arthritic  joint swelling.  SKIN: Warm and dry, no erythema noted, no rashes or lesions.Staff reports stage 2 to sacral, dressed, unable to assess  NEUROLOGICAL: The patient is oriented to person, place and time. Strength and sensation are grossly intact. Face is symmetric.            LABS:    Lab Results   Component Value Date    WBC 6.6 08/04/2019    HGB 9.6 (L) 08/04/2019    HCT 32.0 (L) 08/04/2019    MCV 91 08/04/2019     08/04/2019       Results for orders placed or performed during the hospital encounter of 08/02/19   Basic Metabolic Panel   Result Value Ref Range    Sodium 139 136 - 145 mmol/L    Potassium 4.7 3.5 - 5.0 mmol/L    Chloride 99 98 - 107 mmol/L    CO2 31 22 - 31 mmol/L    Anion Gap, Calculation 9 5 - 18 mmol/L    Glucose 115 70 - 125 mg/dL    Calcium 9.0 8.5 - 10.5 mg/dL    BUN 37 (H) 8 - 28 mg/dL    Creatinine 1.26 0.70 - 1.30 mg/dL    GFR MDRD Af Amer >60 >60 mL/min/1.73m2    GFR MDRD Non Af Amer 54 (L) >60 mL/min/1.73m2           Lab Results   Component Value Date    HGBA1C 6.9 (H) 06/22/2019     Vitamin D, Total (25-Hydroxy)   Date Value Ref Range Status   04/03/2017 6.6 (L) 30.0 - 80.0 ng/mL Final     Lab Results   Component Value Date    [id number] >2,000 (H) 08/02/2019       ASSESSMENT/PLAN:  1. Frequent falls    2. Anticoagulated on  Coumadin    3. Type 2 diabetes mellitus with complication, without long-term current use of insulin (H)    4. ACP (advance care planning)      1. Frequent Falls: Patient with history of frequent falls was weak at home and continued with falls in TCU now for strengthening and endurance.  To continue working with PT and OT and skilled nursing services to monitor chronic medical conditions.    2.  Anticoagulation on Coumadin: We will continue 4.5 mg of Coumadin every Tuesday Thursday Saturday and Sunday and 3 mg all other days of the week.  Recheck INR 8/12/2019.    3. DM: On Glypizide initiate blood glucose checking twice daily. So we will initiate a providers can monitor during visit.    CBC, BMP, mag level on 8/12/2019 diagnosis related is increased edema and diabetes.    4. ACP: Greater then 16 minutes spent face to face with pt discussing advanced care planning and POLST was signed as DNR this am.    Electronically signed by:  Sarahi Michelle CNP  This progress note was completed using Dragon software and there may be grammatical errors.

## 2021-05-31 NOTE — TELEPHONE ENCOUNTER
[facility] Nurse Triage Anticoagulation Note      Provider: TYRESE Diamond  Facility: [facility]    Facility Type: TCU    Caller: Nohemi  Call Back Number:  [phone]    Reason for call: INR    Today s INR: 2.86  Previous INR:    8/6/19   2.28 3mg M-W-F, 4.5mg AOD.    Diagnosis/Goal: AFIB  Heparin/Lovenox: No   Currently on ABX: No  Other interacting Medications: None  Missed or refused doses: No    Verbal Order/Direction given by Provider: Cont 3mg M-W-F, 4.5mg AOD. Next INR 8/26.    Provider giving order: TYRESE Diamond    Verbal order given to: Nohemi Aceves RN

## 2021-06-01 VITALS — BODY MASS INDEX: 29.66 KG/M2 | WEIGHT: 224.8 LBS

## 2021-06-01 VITALS — HEIGHT: 73 IN | BODY MASS INDEX: 31.41 KG/M2 | WEIGHT: 237 LBS

## 2021-06-01 NOTE — PROGRESS NOTES
[facility]    NAME:  Modesto Martinez             :  1932  MRN: [id number]  CODE STATUS:  DNR    FACILITY:  [facility] [[id number]]       ROOM:   246    CHIEF COMPLAINT/REASON FOR VISIT:  Chief Complaint   Patient presents with     Problem Visit     Mobility evaluation     HISTORY OF PRESENT ILLNESS: Modesto Martinez is a 87 y.o. male with HFpEF, AS s/p bioprosthetic valve/afib on coumadin/CKDIII, recently dc'ed  for TIA and a month before that for HFpEF presented from home s/p recurrent falls.  Recurrent falls, multifactorial likely due to physical deconditioning and dehydration from high dose of Lasix.  2 falls in 24hours prior to admission. Per pt and daughter, likely mechanical. Issues with balance and generalized weakness and muslce loss. Tripped and fell. No LOC. Per CT head and CT cervical spine negative.  EKG- afib rate controlled/TNI negative  -No electrolyte abnormalities to explain rapid onset of weakness  -No myalgia or muscle stiffness to suspect polymyalgia rheumatica, dermatomyositis  -daughter would like the pt to be continued on coumadin and aware of the bleeding risks  -Unable to obtain orthostatic vitals, as patient unable to stand up long enough to check BP due to weakness  -PT/OT recommending TCU  - tele monitoring-no cardiac arrhythmias  -Suspect overdiuresis and contributing, his current weight 195 pounds, patient daughter states he has never been less than 200 pounds, and weight found down to 195 pounds during this hospitalization.  BUN 46, indicating dehydration.  Patient very high dose 80 mg twice a day of Lasix.   -Decrease Lasix 60 mg twice a day, check orthostatic vitals, renal numbers improved.  -Further decrease Lasix dose to 40 mg twice a day and discharge.  Continue with daily weights at TCU.     HFpEF: stable. Appears euvolemic. B/l LE edema seems chronic.   - c/w spironolactone and Lasix at lower dose as above     IDDMII:resume  glipizide.      Afib: c/w digoxin and diltiazem.  Digoxin level pending on the day of discharge.     GERD: PPI     TIA: coumadin/statin     Dyslipidemia: statin     Diabetic/cardiac diet  Patient was stabilized and transferred to TCU for continued rehabilitation.    Mobility Evaluation  The patient has a mobility limitation that significantly impairs his ability to participate in one or more mobility related activities of daily living (MRADL's) such as toileting, feeding, dressing, grooming, and bathing in customary locations in the home. Due to patient's Repeated falls, difficulty walking, muscle weakness, unsteadiness on feet, and CHF he is unable to complete MRADL's within a reasonable time frame or may prevent patient from accomplishing a MRADL entirely and therefore requires a manual wheelchair. Due to patient's Repeated falls, difficulty walking, muscle weakness, unsteadiness on feet, and CHF the patient's mobility limitation cannot be sufficiently resolved by the use of an appropriately fitted cane or walker. The patient's home provides adequate access between room, maneuvering space, and surfaces for use of the manual wheelchair that is provided. The use of a manual wheelchair will significantly improve the patient's ability to participate in MRADL's and the patient will use it on a regular basis in the home. The patient has not expressed an unwillingness to use the manualwheelchair that is provide in the home. The patient has sufficient upper extremity function and other physical and mental capabilities needed to safely self-propel the manual wheelchair that is provided in the home during a typical day. The patient has a caregiver who is available, willing, and able to provide assistance with the wheelchair.    Past Medical History:   Diagnosis Date     Aortic stenosis      Arthritis     knees     Atrial fibrillation (H)      CAD (coronary artery disease)      CHF (congestive heart failure) (H)       Diabetes mellitus, type II (H)      HTN (hypertension)      Hyperlipidemia     controlled on medications     KRISHAN (obstructive sleep apnea)     no CPAP     Past Surgical History:   Procedure Laterality Date     BACK SURGERY  1970    L5     CARDIAC VALVE SURGERY      cow valve     EYE SURGERY Bilateral     cataracts     Family History   Problem Relation Age of Onset     Cancer Mother      Diabetes Father      Social History     Socioeconomic History     Marital status:      Spouse name: Not on file     Number of children: Not on file     Years of education: Not on file     Highest education level: Not on file   Occupational History     Not on file   Social Needs     Financial resource strain: Not on file     Food insecurity:     Worry: Not on file     Inability: Not on file     Transportation needs:     Medical: Not on file     Non-medical: Not on file   Tobacco Use     Smoking status: Never Smoker     Smokeless tobacco: Never Used   Substance and Sexual Activity     Alcohol use: No     Drug use: No     Sexual activity: Not on file   Lifestyle     Physical activity:     Days per week: Not on file     Minutes per session: Not on file     Stress: Not on file   Relationships     Social connections:     Talks on phone: Not on file     Gets together: Not on file     Attends [employer] service: Not on file     Active member of club or organization: Not on file     Attends meetings of clubs or organizations: Not on file     Relationship status: Not on file     Intimate partner violence:     Fear of current or ex partner: Not on file     Emotionally abused: Not on file     Physically abused: Not on file     Forced sexual activity: Not on file   Other Topics Concern     Not on file   Social History Narrative     Not on file     Allergies   Allergen Reactions     Levaquin [Levofloxacin]      Tendonitis     Current Outpatient Medications   Medication Sig Dispense Refill     acetaminophen (TYLENOL) 325 MG tablet Take 2  tablets (650 mg total) by mouth every 4 (four) hours as needed for pain or fever. 20 tablet 0     atorvastatin (LIPITOR) 40 MG tablet Take 1 tablet (40 mg total) by mouth at bedtime. lipids 30 tablet 0     cholecalciferol, vitamin D3, 1,000 unit tablet Take 1 tablet (1,000 Units total) by mouth at bedtime. General health (Patient taking differently: Take 1,000 Units by mouth daily. General health      ) 30 tablet 0     cyanocobalamin 1000 MCG tablet Take 1 tablet (1,000 mcg total) by mouth at bedtime. b12 def (Patient taking differently: Take 1,000 mcg by mouth daily. b12 def      ) 30 tablet 0     digoxin (LANOXIN) 125 mcg tablet Take 1 tablet (125 mcg total) by mouth daily with supper. afib 30 tablet 0     diltiazem (CARDIZEM CD) 120 MG 24 hr capsule Take 1 capsule (120 mg total) by mouth every morning. afib 30 capsule 0     ferrous sulfate 325 (65 FE) MG tablet Take 1 tablet by mouth 2 (two) times a day.              furosemide (LASIX) 40 MG tablet Take 1 tablet (40 mg total) by mouth 2 (two) times a day at 9am and 6pm. 60 tablet 0     glipiZIDE (GLUCOTROL) 5 MG tablet Take 5 mg by mouth daily with breakfast.       lansoprazole (PREVACID) 30 MG capsule Take 1 capsule (30 mg total) by mouth every morning. gerd 30 capsule 0     nitroglycerin (NITROSTAT) 0.4 MG SL tablet Place 1 tablet (0.4 mg total) under the tongue every 5 (five) minutes as needed for chest pain. 30 tablet 0     senna-docusate (PERICOLACE) 8.6-50 mg tablet Take 1 tablet by mouth 2 (two) times a day as needed for constipation.              spironolactone (ALDACTONE) 25 MG tablet Take 0.5 tablets (12.5 mg total) by mouth every morning. chf 30 tablet 0     warfarin (COUMADIN/JANTOVEN) 3 MG tablet Take 3-4.5 mg by mouth See Admin Instructions. 8/19/19 INR 2.86 Cont 3mg M-W-F, 4.5mg AOD. Next INR 8/26.  8/6/19 INR 2.28 3mg M, W, F and 4.5mg AOD. Next INR 8/8  4.5 mg on T, Th, Sa, Sun  3 mg on MWF             No current facility-administered medications  for this visit.        REVIEW OF SYSTEMS:    Currently, no fever, chills, or rigors. Does not have any visual or hearing problems. Denies any chest pain, headaches, palpitations, lightheadedness, dizziness, shortness of breath, or cough. Appetite is good. Denies any GERD symptoms. Denies any difficulty with swallowing, nausea, or vomiting.  Denies any abdominal pain, diarrhea or constipation. Denies any urinary symptoms. No insomnia. No active bleeding. No rash.       PHYSICAL EXAMINATION:  Vitals:    09/03/19 2255   BP: 125/59   Pulse: 88   Resp: 20   Temp: 97.9  F (36.6  C)   SpO2: 95%   Weight: 202 lb 1.6 oz (91.7 kg)       GENERAL: Awake, Alert, oriented x3, not in any form of acute distress, answers questions appropriately, follows simple commands, conversant  HEENT: Head is normocephalic with normal hair distribution. No evidence of trauma. Ears: No acute purulent discharge. Eyes: Conjunctivae pink with no scleral jaundice. Nose: Normal mucosa and septum. NECK: Supple with no cervical or supraclavicular lymphadenopathy. Trachea is midline.   CHEST: No tenderness or deformity, no crepitus  LUNG: Clear to auscultation with good chest expansion. There are no crackles or wheezes, normal AP diameter.  BACK: No kyphosis of the thoracic spine. Symmetric, no curvature, ROM normal, no CVA tenderness, no spinal tenderness   CVS: There is good S1  S2, there are no murmurs, rubs, gallops, or heaves, rhythm is regular,  2+ pulses symmetric in all extremities.  ABDOMEN: Globular and soft, nontender to palpation, non distended, no masses, no organomegaly, good bowel sounds, no rebound or guarding, no peritoneal signs.   EXTREMITIES:  Full range of motion on both upper and lower extremities, there is no tenderness to palpation, no pedal edema, no cyanosis or clubbing, no calf tenderness.  Pulses equal in all extremities, normal cap refill, no joint swelling.  SKIN: Warm and dry, no erythema noted.  Skin color, texture, no  rashes or lesions.  NEUROLOGICAL: The patient is oriented to person, place and time. Strength and sensation are grossly intact. Face is symmetric.    LABS:      Lab Results   Component Value Date    WBC 6.2 08/12/2019    HGB 9.4 (L) 08/12/2019    HCT 31.1 (L) 08/12/2019    MCV 91 08/12/2019     08/12/2019     Results for orders placed or performed in visit on 08/12/19   Basic Metabolic Panel   Result Value Ref Range    Sodium 141 136 - 145 mmol/L    Potassium 4.1 3.5 - 5.0 mmol/L    Chloride 103 98 - 107 mmol/L    CO2 31 22 - 31 mmol/L    Anion Gap, Calculation 7 5 - 18 mmol/L    Glucose 111 70 - 125 mg/dL    Calcium 9.3 8.5 - 10.5 mg/dL    BUN 30 (H) 8 - 28 mg/dL    Creatinine 1.09 0.70 - 1.30 mg/dL    GFR MDRD Af Amer >60 >60 mL/min/1.73m2    GFR MDRD Non Af Amer >60 >60 mL/min/1.73m2     Lab Results   Component Value Date    HGBA1C 6.9 (H) 06/22/2019     Vitamin D, Total (25-Hydroxy)   Date Value Ref Range Status   04/03/2017 6.6 (L) 30.0 - 80.0 ng/mL Final     Lab Results   Component Value Date    [id number] >2,000 (H) 08/02/2019       ASSESSMENT/PLAN:    1. Impaired mobility - Upon discharge, patient will need a wheelchair with cushion and bilateral arm and foot rests   2. Frequent falls - See aobve   3. Difficulty walking - See above   4. Persistent atrial fibrillation (H) - INR 2.61, will give Coumadin 5 mg daily and will check INR on 8/29/2019.  Patient is also on Cardizem   5. Essential hypertension with goal blood pressure less than 140/90 - Blood pressures are within target range,w ill continue Lasix, Spironolactone, and Cardizem               Electronically signed by:  Dario Santillan CNP    Total time spent on the unit was 40 minutes of which 25 minutes was spent in counseling on wheelchair needs upon discharge, Atrial fibrillation, and INR/Coumadin dosing to prevent falls and Coumadin and coordination of care of the above plan with nursing staff, patient, and therapy.

## 2021-06-02 ENCOUNTER — RECORDS - HEALTHEAST (OUTPATIENT)
Dept: ADMINISTRATIVE | Facility: CLINIC | Age: 86
End: 2021-06-02

## 2021-06-02 NOTE — PROGRESS NOTES
[facility]    NAME:  Modesto Martinez             :  1932  MRN: [id number]  CODE STATUS:  DNR    FACILITY:  [facility] [[id number]]       ROOM:   246    CHIEF COMPLAINT/REASON FOR VISIT:  Chief Complaint   Patient presents with     Problem Visit     Atrial Fibrillation     HISTORY OF PRESENT ILLNESS: Modesto Martinez is a 87 y.o. male with HFpEF, AS s/p bioprosthetic valve/afib on coumadin/CKDIII, recently dc'ed  for TIA and a month before that for HFpEF presented from home s/p recurrent falls.  Recurrent falls, multifactorial likely due to physical deconditioning and dehydration from high dose of Lasix.  2 falls in 24hours prior to admission. Per pt and daughter, likely mechanical. Issues with balance and generalized weakness and muslce loss. Tripped and fell. No LOC. Per CT head and CT cervical spine negative.  EKG- afib rate controlled/TNI negative  -No electrolyte abnormalities to explain rapid onset of weakness  -No myalgia or muscle stiffness to suspect polymyalgia rheumatica, dermatomyositis  -daughter would like the pt to be continued on coumadin and aware of the bleeding risks  -Unable to obtain orthostatic vitals, as patient unable to stand up long enough to check BP due to weakness  -PT/OT recommending TCU  - tele monitoring-no cardiac arrhythmias  -Suspect overdiuresis and contributing, his current weight 195 pounds, patient daughter states he has never been less than 200 pounds, and weight found down to 195 pounds during this hospitalization.  BUN 46, indicating dehydration.  Patient very high dose 80 mg twice a day of Lasix.   -Decrease Lasix 60 mg twice a day, check orthostatic vitals, renal numbers improved.  -Further decrease Lasix dose to 40 mg twice a day and discharge.  Continue with daily weights at TCU.     HFpEF: stable. Appears euvolemic. B/l LE edema seems chronic.   - c/w spironolactone and Lasix at lower dose as above     IDDMII:resume  glipizide.      Afib: c/w digoxin and diltiazem.  Digoxin level pending on the day of discharge.     GERD: PPI     TIA: coumadin/statin     Dyslipidemia: statin     Diabetic/cardiac diet  Patient was stabilized and transferred to TCU for continued rehabilitation.    Today, patient is seen at the bedside.  He has Atrial Fibrillation and anticoagulated with Coumadin as well as Digoxin and Diltiazem.  Diabetes Mellitus is well controlled on Glipizide.  Blood sugars range from 100s - 320s.  Asyptomatic.  HF is euvolemic  with chronic bilateral lower extremity edema.  No recent falls.  Normotensive.     Past Medical History:   Diagnosis Date     Aortic stenosis      Arthritis     knees     Atrial fibrillation (H)      CAD (coronary artery disease)      CHF (congestive heart failure) (H)      Diabetes mellitus, type II (H)      HTN (hypertension)      Hyperlipidemia     controlled on medications     KRISHAN (obstructive sleep apnea)     no CPAP     Past Surgical History:   Procedure Laterality Date     BACK SURGERY  1970    L5     CARDIAC VALVE SURGERY      cow valve     EYE SURGERY Bilateral     cataracts     Family History   Problem Relation Age of Onset     Cancer Mother      Diabetes Father      Social History     Socioeconomic History     Marital status:      Spouse name: Not on file     Number of children: Not on file     Years of education: Not on file     Highest education level: Not on file   Occupational History     Not on file   Social Needs     Financial resource strain: Not on file     Food insecurity:     Worry: Not on file     Inability: Not on file     Transportation needs:     Medical: Not on file     Non-medical: Not on file   Tobacco Use     Smoking status: Never Smoker     Smokeless tobacco: Never Used   Substance and Sexual Activity     Alcohol use: No     Drug use: No     Sexual activity: Not on file   Lifestyle     Physical activity:     Days per week: Not on file     Minutes per session: Not on  file     Stress: Not on file   Relationships     Social connections:     Talks on phone: Not on file     Gets together: Not on file     Attends [employer] service: Not on file     Active member of club or organization: Not on file     Attends meetings of clubs or organizations: Not on file     Relationship status: Not on file     Intimate partner violence:     Fear of current or ex partner: Not on file     Emotionally abused: Not on file     Physically abused: Not on file     Forced sexual activity: Not on file   Other Topics Concern     Not on file   Social History Narrative     Not on file     Allergies   Allergen Reactions     Levaquin [Levofloxacin]      Tendonitis     Current Outpatient Medications   Medication Sig Dispense Refill     acetaminophen (TYLENOL) 325 MG tablet Take 2 tablets (650 mg total) by mouth every 4 (four) hours as needed for pain or fever. 20 tablet 0     atorvastatin (LIPITOR) 40 MG tablet Take 1 tablet (40 mg total) by mouth at bedtime. lipids 30 tablet 0     cholecalciferol, vitamin D3, 1,000 unit tablet Take 1 tablet (1,000 Units total) by mouth at bedtime. General health (Patient taking differently: Take 1,000 Units by mouth daily. General health      ) 30 tablet 0     cyanocobalamin 1000 MCG tablet Take 1 tablet (1,000 mcg total) by mouth at bedtime. b12 def (Patient taking differently: Take 1,000 mcg by mouth daily. b12 def      ) 30 tablet 0     digoxin (LANOXIN) 125 mcg tablet Take 1 tablet (125 mcg total) by mouth daily with supper. afib 30 tablet 0     diltiazem (CARDIZEM CD) 120 MG 24 hr capsule Take 1 capsule (120 mg total) by mouth every morning. afib 30 capsule 0     ferrous sulfate 325 (65 FE) MG tablet Take 1 tablet by mouth 2 (two) times a day.              furosemide (LASIX) 40 MG tablet Take 1 tablet (40 mg total) by mouth 2 (two) times a day at 9am and 6pm. 60 tablet 0     glipiZIDE (GLUCOTROL) 5 MG tablet Take 5 mg by mouth daily with breakfast.       lansoprazole  (PREVACID) 30 MG capsule Take 1 capsule (30 mg total) by mouth every morning. gerd 30 capsule 0     nitroglycerin (NITROSTAT) 0.4 MG SL tablet Place 1 tablet (0.4 mg total) under the tongue every 5 (five) minutes as needed for chest pain. 30 tablet 0     senna-docusate (PERICOLACE) 8.6-50 mg tablet Take 1 tablet by mouth 2 (two) times a day as needed for constipation.              spironolactone (ALDACTONE) 25 MG tablet Take 0.5 tablets (12.5 mg total) by mouth every morning. chf 30 tablet 0     warfarin (COUMADIN/JANTOVEN) 3 MG tablet Take 3-4.5 mg by mouth See Admin Instructions. 8/19/19 INR 2.86 Cont 3mg M-W-F, 4.5mg AOD. Next INR 8/26.  8/6/19 INR 2.28 3mg M, W, F and 4.5mg AOD. Next INR 8/8  4.5 mg on T, Th, Sa, Sun  3 mg on MWF             No current facility-administered medications for this visit.        REVIEW OF SYSTEMS:    Currently, no fever, chills, or rigors. Does not have any visual or hearing problems. Denies any chest pain, headaches, palpitations, lightheadedness, dizziness, shortness of breath, or cough. Appetite is good. Denies any GERD symptoms. Denies any difficulty with swallowing, nausea, or vomiting.  Denies any abdominal pain, diarrhea or constipation. Denies any urinary symptoms. No insomnia. No active bleeding. No rash.       PHYSICAL EXAMINATION:  Vitals:    10/11/19 2119   BP: 128/55   Pulse: 81   Resp: 18   Temp: 97.5  F (36.4  C)   SpO2: 92%   Weight: 200 lb 14.4 oz (91.1 kg)       GENERAL: Awake, Alert, oriented x3, not in any form of acute distress, answers questions appropriately, follows simple commands, conversant  HEENT: Head is normocephalic with normal hair distribution. No evidence of trauma. Ears: No acute purulent discharge. Eyes: Conjunctivae pink with no scleral jaundice. Nose: Normal mucosa and septum. NECK: Supple with no cervical or supraclavicular lymphadenopathy. Trachea is midline.   CHEST: No tenderness or deformity, no crepitus  LUNG: Clear to auscultation with  good chest expansion. There are no crackles or wheezes, normal AP diameter.  BACK: No kyphosis of the thoracic spine. Symmetric, no curvature, ROM normal, no CVA tenderness, no spinal tenderness   CVS: There is good S1  S2, there are no murmurs, rubs, gallops, or heaves, rhythm is regular,  2+ pulses symmetric in all extremities.  ABDOMEN: Globular and soft, nontender to palpation, non distended, no masses, no organomegaly, good bowel sounds, no rebound or guarding, no peritoneal signs.   EXTREMITIES:  Full range of motion on both upper and lower extremities, there is no tenderness to palpation, no pedal edema, no cyanosis or clubbing, no calf tenderness.  Pulses equal in all extremities, normal cap refill, no joint swelling.  SKIN: Warm and dry, no erythema noted.  Skin color, texture, no rashes or lesions.  NEUROLOGICAL: The patient is oriented to person, place and time. Strength and sensation are grossly intact. Face is symmetric.    LABS:      Lab Results   Component Value Date    WBC 6.2 08/12/2019    HGB 9.4 (L) 08/12/2019    HCT 31.1 (L) 08/12/2019    MCV 91 08/12/2019     08/12/2019     Results for orders placed or performed in visit on 08/12/19   Basic Metabolic Panel   Result Value Ref Range    Sodium 141 136 - 145 mmol/L    Potassium 4.1 3.5 - 5.0 mmol/L    Chloride 103 98 - 107 mmol/L    CO2 31 22 - 31 mmol/L    Anion Gap, Calculation 7 5 - 18 mmol/L    Glucose 111 70 - 125 mg/dL    Calcium 9.3 8.5 - 10.5 mg/dL    BUN 30 (H) 8 - 28 mg/dL    Creatinine 1.09 0.70 - 1.30 mg/dL    GFR MDRD Af Amer >60 >60 mL/min/1.73m2    GFR MDRD Non Af Amer >60 >60 mL/min/1.73m2     Lab Results   Component Value Date    HGBA1C 6.9 (H) 06/22/2019     Vitamin D, Total (25-Hydroxy)   Date Value Ref Range Status   04/03/2017 6.6 (L) 30.0 - 80.0 ng/mL Final     Lab Results   Component Value Date    [id number] >2,000 (H) 08/02/2019       ASSESSMENT/PLAN:    1. Persistent atrial fibrillation - INR today 3.25 - Hold  Coumadin and check INR 8/302019. Patient is also on Cardizem   2. Physical deconditioning - Continue PT/OT   3. Essential hypertension with goal blood pressure less than 140/90 - Blood pressures are within target range,w ill continue Lasix, Spironolactone, and Cardizem   4. Frequent falls - None ecently   5. Type 2 diabetes mellitus with complication, without long-term current use of insulin (H)                      Electronically signed by:  Dario Santillan CNP    .

## 2021-06-03 VITALS — WEIGHT: 198.8 LBS | BODY MASS INDEX: 26.23 KG/M2

## 2021-06-03 VITALS — BODY MASS INDEX: 28.3 KG/M2 | WEIGHT: 208.7 LBS

## 2021-06-03 VITALS — BODY MASS INDEX: 26.18 KG/M2 | WEIGHT: 198.4 LBS

## 2021-06-03 VITALS — BODY MASS INDEX: 26.66 KG/M2 | WEIGHT: 202.1 LBS

## 2021-06-03 VITALS — HEIGHT: 72 IN | BODY MASS INDEX: 30.2 KG/M2 | WEIGHT: 223 LBS

## 2021-06-03 VITALS — WEIGHT: 231 LBS | HEIGHT: 72 IN | BODY MASS INDEX: 31.29 KG/M2

## 2021-06-03 VITALS — WEIGHT: 200.9 LBS | BODY MASS INDEX: 26.51 KG/M2

## 2021-06-03 VITALS — WEIGHT: 210.9 LBS | BODY MASS INDEX: 28.6 KG/M2

## 2021-06-09 NOTE — PROGRESS NOTES
[facility]    DATE: 2017    NAME:  Modesto Martinez             :  1932  MRN: [id number]  CODE STATUS:  FULL CODE    FACILITY:  [facility] [[id number]]       ROOM:   211    CHIEF COMPLAINT/REASON FOR VISIT:  Chief Complaint   Patient presents with     Problem Visit     CHF and Bilateral RANJIT     HISTORY OF PRESENT ILLNESS: Modesto Martinez is a 84 y.o. male who has a  medical history for AS s/p prosthetic aortic valve as well as atrial flutter on Coumadin who presented to this Emergency Department for evaluation of nosebleed.  He reported rubbing his nose while watching TV and sustained onset of bleeding from right nares. Packing was placed, as bleeding could not be controlled with pressure and Epinephrine. After Rhino rocket was placed, he was placed on Keflex and sent home.   He returned to the hospital because of weakness, melena, diarrhea, and passing out in the bathroom.  CT of the head showed no signs of intracranial bleed. Hemoglobin was found to be 9.9 which is an acute drop from 13.1. Platelets slightly lowered at 131 versus 140.  BMP relatively normal barring acutely elevated BUN of 43 from a year ago and elevated glucose of 193. Magnesium and troponin normal. INR therapeutic at 2.06. EKGs showed Atrial fibrillation.  He received Vitamin K, Coumadin, and had 1 unit of PRBC.   GI was consulted upon admission given concern for possible GI bleed, reported most likely from epistaxis but if melena persisted after epistaxis resolves then would consider EGD vs esophagram given reports of dysphagia. Pt with significant orthostasis on 3/17 so transfused 1U pRBCs and improvement in dizziness. Hemoglobin stable and >9 following INR reversal and transfusion. Nasal packing removed at bedside on 3/18/17 without complications and no further bleeding. Restarted  Warfarin on 3/21 at 4mg daily, his home dose, with follow up INRs at TCU and management.    Today, he reports  overall improvement.  He denies any uncontrolled pain. He has a home visit today and hopes to go home over the weekend.  I spoke with therapy and they are looking at a firm date of 4/13/2017 with discharge on 4/14/17.  This may change after the home visit.  He will need to go home with home care services.  His bilateral lower extremity edema is improving slightly.  His legs looked better on Tuesday than today.  When asked about SAMANTA hose he refused and before I could ask he refused the ace wraps as well.  He prefers his diabetic socks with feet elevation.  He has chronic CHF with an EF 60% and continues on Lasix. Currently denies CP or any other CHF symptoms.     Past Medical History:   Diagnosis Date     Aortic stenosis      Arthritis     knees     Atrial fibrillation      CAD (coronary artery disease)      CHF (congestive heart failure)      Diabetes mellitus, type II      HTN (hypertension)      Hyperlipidemia     controlled on medications     KRISHAN (obstructive sleep apnea)     no CPAP     Past Surgical History:   Procedure Laterality Date     BACK SURGERY  1970    L5     CARDIAC VALVE SURGERY      cow valve     EYE SURGERY Bilateral     cataracts     Family History   Problem Relation Age of Onset     Cancer Mother      Diabetes Father      Social History     Social History     Marital status:      Spouse name: N/A     Number of children: N/A     Years of education: N/A     Occupational History     Not on file.     Social History Main Topics     Smoking status: Never Smoker     Smokeless tobacco: Never Used     Alcohol use No     Drug use: No     Sexual activity: Not on file     Other Topics Concern     Not on file     Social History Narrative     Allergies   Allergen Reactions     Levaquin [Levofloxacin]      Current Outpatient Prescriptions   Medication Sig Dispense Refill     albuterol (PROVENTIL HFA;VENTOLIN HFA) 90 mcg/actuation inhaler Inhale 2 puffs every 4 (four) hours as needed for wheezing or  shortness of breath.       albuterol (PROVENTIL) 2.5 mg /3 mL (0.083 %) nebulizer solution Take 2.5 mg by nebulization every 4 (four) hours as needed for wheezing.       atorvastatin (LIPITOR) 40 MG tablet Take 40 mg by mouth bedtime.       calcium carbonate-vitamin D3 (CALCIUM 600 + D,3,) 600 mg(1,500mg) -400 unit per tablet Take 1 tablet by mouth daily.       digoxin (LANOXIN) 125 mcg tablet Take 1 tablet (125 mcg total) by mouth daily. 30 tablet 0     diltiazem (CARDIZEM CD) 180 MG 24 hr capsule Take 180 mg by mouth every morning.        ergocalciferol (VITAMIN D2) 50,000 unit capsule Take 50,000 Units by mouth once a week. (for 12 weeks)       furosemide (LASIX) 40 MG tablet Take 1 tablet (40 mg total) by mouth daily. (Patient taking differently: Take 60 mg by mouth daily. )  0     glipiZIDE (GLUCOTROL) 10 MG 24 hr tablet Take 10 mg by mouth every morning.        lansoprazole (PREVACID) 30 MG capsule Take 30 mg by mouth every morning.        ondansetron (ZOFRAN) 4 MG tablet Take 4 mg by mouth every 8 (eight) hours as needed for nausea.       polyethylene glycol (MIRALAX) 17 gram packet Take 1 packet (17 g total) by mouth daily as needed. 24 each 0     senna-docusate (PERICOLACE) 8.6-50 mg tablet Take 1 tablet by mouth 2 (two) times a day as needed for constipation.       senna-docusate (SENNOSIDES-DOCUSATE SODIUM) 8.6-50 mg tablet Take 2 tablets by mouth 2 (two) times a day.        sitaGLIPtin (JANUVIA) 100 MG tablet Take 50 mg by mouth every morning. Takes 1/2 of 100mg tablet for 50mg dose.       spironolactone (ALDACTONE) 25 MG tablet Take 12.5 mg by mouth every morning. Takes 1/2 of 25mg tablet for 12.5mg dose       warfarin (COUMADIN) 4 MG tablet Take 1 tablet (4 mg total) by mouth daily. Restart on Tuesday 3/21/17 - further management by PCP or TCU physicians. 30 tablet 0     white petrolatum (AQUAPHOR NATURAL HEALING) 41 % Oint Apply small amount to bilateral inner nostrils with Qtip twice per day for 30  days. 50 g 1     No current facility-administered medications for this visit.        REVIEW OF SYSTEMS      PHYSICAL EXAMINATION:    Vitals:    04/06/17 1242   BP: 142/66   Pulse: 68   Resp: 16   Temp: 97.3  F (36.3  C)   SpO2: 97%   Weight: (!) 248 lb 14.4 oz (112.9 kg)     Patient is alert, awake, oriented to time, place and person, not in acute cardiorespiratory distress  Skin: Warm, and moist,  no lesions,   Head: atraumatic, normocephalic,   No bleeding from nares, no sinus tenderness  Eyes: EOM's intact and conjugate, pink palpebral conjunctivae, anicteric sclerae, pupils reactive  Lungs : Clear to auscultation , no crackles, wheezes or rhonchi  Heart : Nornal first and second heart sounds, irreg irregNo murmurs, heaves, or thrills  Abdomen: Soft, non tender, non distended, no hepatosplenomegaly, no ascites  Extremities:(+) bipedal pitting  edema, pulses are full and equal    LABS:     Lab Results   Component Value Date    WBC 4.9 03/27/2017    HGB 9.3 (L) 03/27/2017    HCT 29.3 (L) 03/27/2017    MCV 99 03/27/2017     (L) 03/27/2017     Results for orders placed or performed in visit on 03/27/17   Basic Metabolic Panel   Result Value Ref Range    Sodium 138 136 - 145 mmol/L    Potassium 3.7 3.5 - 5.0 mmol/L    Chloride 102 98 - 107 mmol/L    CO2 29 22 - 31 mmol/L    Anion Gap, Calculation 7 5 - 18 mmol/L    Glucose 90 70 - 125 mg/dL    Calcium 8.8 8.5 - 10.5 mg/dL    BUN 11 8 - 28 mg/dL    Creatinine 1.09 0.70 - 1.30 mg/dL    GFR MDRD Af Amer >60 >60 mL/min/1.73m2    GFR MDRD Non Af Amer >60 >60 mL/min/1.73m2     Lab Results   Component Value Date    HGBA1C 6.4 (H) 03/15/2017     Vitamin D, Total (25-Hydroxy)   Date Value Ref Range Status   04/03/2017 6.6 (L) 30.0 - 80.0 ng/mL Final     Lab Results   Component Value Date    [id number] 274 04/03/2017     Lab Results   Component Value Date    TSH 2.64 04/03/2017     ASSESSMENT/PLAN:    1. Persistent atrial fibrillation - INR 2.03, Give Coumadin 7.5 mg Th,  Fr, and Sa and 5 mg on Tierney.  Check INR on 4/10/17   2. Anticoagulated on Coumadin -  See # 1   3. Bilateral lower extremity edema - Diabetic socks, refuses SAMANTA hose and Ace wraps, will continue to elevate feet throughout the day   4. Chronic diastolic congestive heart failure - Weights stable, continue Lasix and Spironolactone. Last Echo showed EF of 60% in February 2016.  Increase Lasix to 60 mg daily   5. Type 2 diabetes mellitus with complication, without long-term current use of insulin -  Blood sugars are within target range, will continue Glucotrol and Januvia.  Last Hgb A1c is 6.4%.  Will change BGM to daily, alternating times                 Electronically signed by:  Dario Santillan CNP    Total of greater than 35 minutes of which 50% was spent in counseling and coordination of care of the above plan.    Time spent in interview and examination of patient, review of available records, and discussion with nursing staff. Continue care plan, efforts at therapy, and monitor nutritional status.

## 2021-06-09 NOTE — PROGRESS NOTES
[facility]    DATE: 2017    NAME:  Modesto Martinez             :  1932  MRN: [id number]  CODE STATUS:  FULL CODE    FACILITY:  [facility] [[id number]]       ROOM:   211    CHIEF COMPLAINT/REASON FOR VISIT:  Chief Complaint   Patient presents with     Problem Visit     Type 2 diabetes mellitus with complication, without long-term current use of insulin     HISTORY OF PRESENT ILLNESS: Modesto Martinez is a 84 y.o. male who has a  medical history for AS s/p prosthetic aortic valve as well as atrial flutter on Coumadin who presented to this Emergency Department for evaluation of nosebleed.  He reported rubbing his nose while watching TV and sustained onset of bleeding from right nares. Packing was placed, as bleeding could not be controlled with pressure and Epinephrine. After Rhino rocket was placed, he was placed on Keflex and sent home.   He returned to the hospital because of weakness, melena, diarrhea, and passing out in the bathroom.  CT of the head showed no signs of intracranial bleed. Hemoglobin was found to be 9.9 which is an acute drop from 13.1. Platelets slightly lowered at 131 versus 140.  BMP relatively normal barring acutely elevated BUN of 43 from a year ago and elevated glucose of 193. Magnesium and troponin normal. INR therapeutic at 2.06. EKGs showed Atrial fibrillation.  He received Vitamin K, Coumadin, and had 1 unit of PRBC.   GI was consulted upon admission given concern for possible GI bleed, reported most likely from epistaxis but if melena persisted after epistaxis resolves then would consider EGD vs esophagram given reports of dysphagia. Pt with significant orthostasis on 3/17 so transfused 1U pRBCs and improvement in dizziness. Hemoglobin stable and >9 following INR reversal and transfusion. Nasal packing removed at bedside on 3/18/17 without complications and no further bleeding. Restarted  Warfarin on 3/21 at 4mg daily, his home dose, with  "follow up INRs at TCU and management.    Today, he reports no recurrence of nosebleeds.  He reports some SOB with exertion but he recovers quickly with rest.  He is on Miralax daily and Senna S 2 tabs BID for constipation that he now reports have improved and \"has gone the other way.\"   Diabetes - He is on Glucotrol SL 10 mg daily and Januvia 50 mg daily.  His last Hgb A1c was 6.4%. Currently, working with PT/OT and ambulating with a walker. Bilateral lower extremity edema is improving. He has chronic CHF with an EF 60% and continues on Lasix. Currently denies SOB, CP,  or CHF symptoms.     Past Medical History:   Diagnosis Date     Aortic stenosis      Arthritis     knees     Atrial fibrillation      CAD (coronary artery disease)      CHF (congestive heart failure)      Diabetes mellitus, type II      HTN (hypertension)      Hyperlipidemia     controlled on medications     KRISHAN (obstructive sleep apnea)     no CPAP     Past Surgical History:   Procedure Laterality Date     BACK SURGERY  1970    L5     CARDIAC VALVE SURGERY      cow valve     EYE SURGERY Bilateral     cataracts     Family History   Problem Relation Age of Onset     Cancer Mother      Diabetes Father      Social History     Social History     Marital status:      Spouse name: N/A     Number of children: N/A     Years of education: N/A     Occupational History     Not on file.     Social History Main Topics     Smoking status: Never Smoker     Smokeless tobacco: Never Used     Alcohol use No     Drug use: No     Sexual activity: Not on file     Other Topics Concern     Not on file     Social History Narrative     Allergies   Allergen Reactions     Levaquin [Levofloxacin]      Current Outpatient Prescriptions   Medication Sig Dispense Refill     albuterol (PROVENTIL HFA;VENTOLIN HFA) 90 mcg/actuation inhaler Inhale 2 puffs every 4 (four) hours as needed for wheezing or shortness of breath.       albuterol (PROVENTIL) 2.5 mg /3 mL (0.083 %) " nebulizer solution Take 2.5 mg by nebulization every 4 (four) hours as needed for wheezing.       atorvastatin (LIPITOR) 40 MG tablet Take 40 mg by mouth bedtime.       calcium carbonate-vitamin D3 (CALCIUM 600 + D,3,) 600 mg(1,500mg) -400 unit per tablet Take 1 tablet by mouth daily.       digoxin (LANOXIN) 125 mcg tablet Take 1 tablet (125 mcg total) by mouth daily. 30 tablet 0     diltiazem (CARDIZEM CD) 180 MG 24 hr capsule Take 180 mg by mouth every morning.        ergocalciferol (VITAMIN D2) 50,000 unit capsule Take 50,000 Units by mouth once a week. (for 12 weeks)       furosemide (LASIX) 40 MG tablet Take 1 tablet (40 mg total) by mouth daily. (Patient taking differently: Take 60 mg by mouth daily. )  0     glipiZIDE (GLUCOTROL) 10 MG 24 hr tablet Take 10 mg by mouth every morning.        lansoprazole (PREVACID) 30 MG capsule Take 30 mg by mouth every morning.        ondansetron (ZOFRAN) 4 MG tablet Take 4 mg by mouth every 8 (eight) hours as needed for nausea.       polyethylene glycol (MIRALAX) 17 gram packet Take 1 packet (17 g total) by mouth daily as needed. 24 each 0     senna-docusate (PERICOLACE) 8.6-50 mg tablet Take 1 tablet by mouth 2 (two) times a day as needed for constipation.       senna-docusate (SENNOSIDES-DOCUSATE SODIUM) 8.6-50 mg tablet Take 2 tablets by mouth 2 (two) times a day.        sitaGLIPtin (JANUVIA) 100 MG tablet Take 50 mg by mouth every morning. Takes 1/2 of 100mg tablet for 50mg dose.       spironolactone (ALDACTONE) 25 MG tablet Take 12.5 mg by mouth every morning. Takes 1/2 of 25mg tablet for 12.5mg dose       warfarin (COUMADIN) 4 MG tablet Take 1 tablet (4 mg total) by mouth daily. Restart on Tuesday 3/21/17 - further management by PCP or TCU physicians. 30 tablet 0     white petrolatum (AQUAPHOR NATURAL HEALING) 41 % Oint Apply small amount to bilateral inner nostrils with Qtip twice per day for 30 days. 50 g 1     No current facility-administered medications for this  visit.        REVIEW OF SYSTEMS      PHYSICAL EXAMINATION  Vitals:    03/29/17 1108   BP: 154/71   Pulse: 91   Resp: 22   Temp: 97.7  F (36.5  C)   SpO2: 97%   Weight: (!) 257 lb 4.8 oz (116.7 kg)         General: Awake, Alert, oriented x3, not in any form of acute distress, answers questions appropriately, follows simple commands, conversant  HEENT: Pink conjunctiva, anicteric sclerae, oral mucosa is moist  NECK: Supple, without any lymphadenopathy, thyromegaly or any masses  LUNG: Clear to auscultation with good chest expansion. There are no crackles or wheezes, normal AP diameter  BACK: No kyphosis of the thoracic spine  CVS: There is good S1  S2, there are no murmurs or heaves, rhythm is regular  ABDOMEN: Globular and soft, nontender to palpation, no organomegaly, good bowel sounds  EXTREMITIES: Good range of motion on both upper and lower extremities, no pedal edema, no cyanosis or clubbing, no calf tenderness  SKIN: Warm and dry, no rashes or erythema noted        LABS:  All labs reviewed in the nursing home record.  Lab Results   Component Value Date    WBC 4.9 03/27/2017    HGB 9.3 (L) 03/27/2017    HCT 29.3 (L) 03/27/2017    MCV 99 03/27/2017     (L) 03/27/2017     Results for orders placed or performed in visit on 03/27/17   Basic Metabolic Panel   Result Value Ref Range    Sodium 138 136 - 145 mmol/L    Potassium 3.7 3.5 - 5.0 mmol/L    Chloride 102 98 - 107 mmol/L    CO2 29 22 - 31 mmol/L    Anion Gap, Calculation 7 5 - 18 mmol/L    Glucose 90 70 - 125 mg/dL    Calcium 8.8 8.5 - 10.5 mg/dL    BUN 11 8 - 28 mg/dL    Creatinine 1.09 0.70 - 1.30 mg/dL    GFR MDRD Af Amer >60 >60 mL/min/1.73m2    GFR MDRD Non Af Amer >60 >60 mL/min/1.73m2         Lab Results   Component Value Date    HGBA1C 6.4 (H) 03/15/2017     Vitamin D, Total (25-Hydroxy)   Date Value Ref Range Status   04/03/2017 6.6 (L) 30.0 - 80.0 ng/mL Final     Lab Results   Component Value Date    [id number] 274 04/03/2017     Lab Results    Component Value Date    TSH 2.64 04/03/2017           ASSESSMENT/PLAN:    1. Type 2 diabetes mellitus with complication, without long-term current use of insulin - Will check blood sugars BID and continue Glucotrol and Januvia.  Last Hgb A1c is 6.4%   2. Chronic diastolic congestive heart failure - Weights stable, continue Lasix and Spironolactone. Last Echo showed EF of 60% in February 2016.  Increase Lasix to 60 mg daily   3. Melena - Bleeding has stopped with No blood noted in the stool   4. Essential hypertension with goal blood pressure less than 140/90 - Blood pressures are within target range will continue Aldactone and Lasix   5. Obstructive Sleep Apnea    6. Persistent atrial fibrillation - Coumadin restarted on 3/21/2017 coupled with Cardizem   7. Constipation - BMs are now regular.  Will change Miralax to prn and increase Senna S to 2 tabs BID         Electronically signed by:  Dario Santillan CNP

## 2021-06-09 NOTE — PROGRESS NOTES
[facility]    DATE: 2017    NAME:  Modesto Martinez             :  1932  MRN: [id number]  CODE STATUS:  FULL CODE    FACILITY:  [facility] [[id number]]       ROOM:   211    CHIEF COMPLAINT/REASON FOR VISIT:  Chief Complaint   Patient presents with     Problem Visit     Chronic diastolic congestive heart failure     HISTORY OF PRESENT ILLNESS: Modesto Martinez is a 84 y.o. male who has a  medical history for AS s/p prosthetic aortic valve as well as atrial flutter on Coumadin who presented to this Emergency Department for evaluation of nosebleed.  He reported rubbing his nose while watching TV and sustained onset of bleeding from right nares. Packing was placed, as bleeding could not be controlled with pressure and Epinephrine. After Rhino rocket was placed, he was placed on Keflex and sent home.   He returned to the hospital because of weakness, melena, diarrhea, and passing out in the bathroom.  CT of the head showed no signs of intracranial bleed. Hemoglobin was found to be 9.9 which is an acute drop from 13.1. Platelets slightly lowered at 131 versus 140.  BMP relatively normal barring acutely elevated BUN of 43 from a year ago and elevated glucose of 193. Magnesium and troponin normal. INR therapeutic at 2.06. EKGs showed Atrial fibrillation.  He received Vitamin K, Coumadin, and had 1 unit of PRBC.   GI was consulted upon admission given concern for possible GI bleed, reported most likely from epistaxis but if melena persisted after epistaxis resolves then would consider EGD vs esophagram given reports of dysphagia. Pt with significant orthostasis on 3/17 so transfused 1U pRBCs and improvement in dizziness. Hemoglobin stable and >9 following INR reversal and transfusion. Nasal packing removed at bedside on 3/18/17 without complications and no further bleeding. Restarted  Warfarin on 3/21 at 4mg daily, his home dose, with follow up INRs at TCU and  management.    Today, he reports overall improvement.  He denies any uncontrolled pain.  His bilateral lower extremity edema is improving.  He has chronic CHF with an EF 60% and continues on Lasix. Currently denies CP or any other CHF symptoms. He has a home visit on 4/6/2017 with possible discharge early next week    Past Medical History:   Diagnosis Date     Aortic stenosis      Arthritis     knees     Atrial fibrillation      CAD (coronary artery disease)      CHF (congestive heart failure)      Diabetes mellitus, type II      HTN (hypertension)      Hyperlipidemia     controlled on medications     KRISHAN (obstructive sleep apnea)     no CPAP     Past Surgical History:   Procedure Laterality Date     BACK SURGERY  1970    L5     CARDIAC VALVE SURGERY      cow valve     EYE SURGERY Bilateral     cataracts     Family History   Problem Relation Age of Onset     Cancer Mother      Diabetes Father      Social History     Social History     Marital status:      Spouse name: N/A     Number of children: N/A     Years of education: N/A     Occupational History     Not on file.     Social History Main Topics     Smoking status: Never Smoker     Smokeless tobacco: Never Used     Alcohol use No     Drug use: No     Sexual activity: Not on file     Other Topics Concern     Not on file     Social History Narrative     Allergies   Allergen Reactions     Levaquin [Levofloxacin]      Current Outpatient Prescriptions   Medication Sig Dispense Refill     albuterol (PROVENTIL HFA;VENTOLIN HFA) 90 mcg/actuation inhaler Inhale 2 puffs every 4 (four) hours as needed for wheezing or shortness of breath.       albuterol (PROVENTIL) 2.5 mg /3 mL (0.083 %) nebulizer solution Take 2.5 mg by nebulization every 4 (four) hours as needed for wheezing.       atorvastatin (LIPITOR) 40 MG tablet Take 40 mg by mouth bedtime.       calcium carbonate-vitamin D3 (CALCIUM 600 + D,3,) 600 mg(1,500mg) -400 unit per tablet Take 1 tablet by mouth  daily.       digoxin (LANOXIN) 125 mcg tablet Take 1 tablet (125 mcg total) by mouth daily. 30 tablet 0     diltiazem (CARDIZEM CD) 180 MG 24 hr capsule Take 180 mg by mouth every morning.        ergocalciferol (VITAMIN D2) 50,000 unit capsule Take 50,000 Units by mouth once a week. (for 12 weeks)       furosemide (LASIX) 40 MG tablet Take 1 tablet (40 mg total) by mouth daily. (Patient taking differently: Take 60 mg by mouth daily. )  0     glipiZIDE (GLUCOTROL) 10 MG 24 hr tablet Take 10 mg by mouth every morning.        lansoprazole (PREVACID) 30 MG capsule Take 30 mg by mouth every morning.        ondansetron (ZOFRAN) 4 MG tablet Take 4 mg by mouth every 8 (eight) hours as needed for nausea.       polyethylene glycol (MIRALAX) 17 gram packet Take 1 packet (17 g total) by mouth daily as needed. 24 each 0     senna-docusate (PERICOLACE) 8.6-50 mg tablet Take 1 tablet by mouth 2 (two) times a day as needed for constipation.       senna-docusate (SENNOSIDES-DOCUSATE SODIUM) 8.6-50 mg tablet Take 2 tablets by mouth 2 (two) times a day.        sitaGLIPtin (JANUVIA) 100 MG tablet Take 50 mg by mouth every morning. Takes 1/2 of 100mg tablet for 50mg dose.       spironolactone (ALDACTONE) 25 MG tablet Take 12.5 mg by mouth every morning. Takes 1/2 of 25mg tablet for 12.5mg dose       warfarin (COUMADIN) 4 MG tablet Take 1 tablet (4 mg total) by mouth daily. Restart on Tuesday 3/21/17 - further management by PCP or TCU physicians. 30 tablet 0     white petrolatum (AQUAPHOR NATURAL HEALING) 41 % Oint Apply small amount to bilateral inner nostrils with Qtip twice per day for 30 days. 50 g 1     No current facility-administered medications for this visit.        REVIEW OF SYSTEMS      PHYSICAL EXAMINATION:    Vitals:    04/04/17 2328   BP: 140/67   Pulse: 87   Resp: 12   Temp: 97.5  F (36.4  C)   SpO2: 97%   Weight: (!) 248 lb 11.2 oz (112.8 kg)     Patient is alert, awake, oriented to time, place and person, not in acute  cardiorespiratory distress  Skin: Warm, and moist,  no lesions,   Head: atraumatic, normocephalic,   No bleeding from nares, no sinus tenderness  Eyes: EOM's intact and conjugate, pink palpebral conjunctivae, anicteric sclerae, pupils reactive  Lungs : Clear to auscultation , no crackles, wheezes or rhonchi  Heart : Nornal first and second heart sounds, irreg irregNo murmurs, heaves, or thrills  Abdomen: Soft, non tender, non distended, no hepatosplenomegaly, no ascites  Extremities:(+) bipedal pitting  edema, pulses are full and equal    LABS:     Lab Results   Component Value Date    WBC 4.9 03/27/2017    HGB 9.3 (L) 03/27/2017    HCT 29.3 (L) 03/27/2017    MCV 99 03/27/2017     (L) 03/27/2017     Results for orders placed or performed in visit on 03/27/17   Basic Metabolic Panel   Result Value Ref Range    Sodium 138 136 - 145 mmol/L    Potassium 3.7 3.5 - 5.0 mmol/L    Chloride 102 98 - 107 mmol/L    CO2 29 22 - 31 mmol/L    Anion Gap, Calculation 7 5 - 18 mmol/L    Glucose 90 70 - 125 mg/dL    Calcium 8.8 8.5 - 10.5 mg/dL    BUN 11 8 - 28 mg/dL    Creatinine 1.09 0.70 - 1.30 mg/dL    GFR MDRD Af Amer >60 >60 mL/min/1.73m2    GFR MDRD Non Af Amer >60 >60 mL/min/1.73m2     Lab Results   Component Value Date    HGBA1C 6.4 (H) 03/15/2017     Vitamin D, Total (25-Hydroxy)   Date Value Ref Range Status   04/03/2017 6.6 (L) 30.0 - 80.0 ng/mL Final     Lab Results   Component Value Date    XDVTNZFJ41 274 04/03/2017     Lab Results   Component Value Date    TSH 2.64 04/03/2017     ASSESSMENT/PLAN:    1. Chronic diastolic congestive heart failure - Weights stable, continue Lasix and Spironolactone. Last Echo showed EF of 60% in February 2016.  Increase Lasix to 60 mg daily   2. Essential hypertension with goal blood pressure less than 140/90 - Blood pressures are within target range will continue Aldactone and Lasix   3. Persistent atrial fibrillation - Anticoagulated with Coumadin with a goal of 2-3   4.  Obstructive Sleep Apnea - Not on CPAP   5. Anticoagulated on Coumadin - INR 2.59, Give Coumadin 7.5 mg today and 5 mg on Wednesday and check INR on 4/6/2017.           Electronically signed by:  Dario Santillan CNP

## 2021-06-09 NOTE — PROGRESS NOTES
[facility]      Facility:    [facility] [[id number]]  Code Status: FULL CODE      Chief Complaint/Reason for Visit:  Chief Complaint   Patient presents with     H & P       HPI:   Modesto is a 84 y.o. male whohas a  past medical history for AS s/p prosthetic aortic valve as well as atrial flutter on Coumadin who presented to this Emergency Department for evaluation of nosebleed.  He is reporting since 9:30 this evening he was rubbing his nose while watching TV and sustained onset of bleeding from right naris. Packing was placed, as bleeding could not be controlled with pressure and epinephrine. After Rhino rocket was placed, he was place d on Keflex and sent home.     Came back to the hospital because of weakness, melena, diarrhea, and passed out. In the bathroom. Taken back to the hospital. CT of the head showed no signs of intracranial bleed. Hemoglobin was found to be 9.9 which is an acutedrop  fromwhen it was 13.1. Platelets slightly lowered 131 versus 140.  BMP relatively normal barring acutely elevated BUN of 43 from a year ago and elevated glucose 193. Magnesium and troponin normal. INR therapeutic at 2.06. EKGs showed atrial fibrillation  He received Vit K, held coumaidin, had 1 unit of PRBC.    GI was consulted upon admission given concern for possible GI bleed, reported most likely from epistaxis but if melena persisted after epistaxis resolves then would consider EGD vs esophagram given reports of dysphagia. Pt with significant orthostasis on 3/17 so transfused 1U pRBCs and improvement in dizziness. Hemoglobin stable and >9 following INR reversal and transfusion. Nasal packing removed at bedside on 3/18/17 without complications and no further bleeding. Restarted  warfarin on 3/21 at 4mg daily his home dose with follow up INRs at TCU and management.    He reports no recurrence of nosebleeds.However sill quite weak. Normally does not use any AD with ambulation. Walking with  walker while here.     Legs quites wollen . He has chronic CHF EF 60% and takes lasix. Currently denies SOB, CP , of CHF symptoms.   ALso have been constipated. Taking senna nd miralax. Normally goes daily, It has been 2 days since last BM. [facility] and [facility] bloody and non melanotic. THus no further GI workup was receommended ,       Past Medical History:   Diagnosis Date     Aortic stenosis      Arthritis     knees     Atrial fibrillation      CAD (coronary artery disease)      CHF (congestive heart failure)      Diabetes mellitus, type II      HTN (hypertension)      Hyperlipidemia     controlled on medications     KRISHAN (obstructive sleep apnea)     no CPAP           Surgical History:  Past Surgical History:   Procedure Laterality Date     BACK SURGERY  1970    L5     CARDIAC VALVE SURGERY      cow valve     EYE SURGERY Bilateral     cataracts       Family History:   Family History   Problem Relation Age of Onset     Cancer Mother      Diabetes Father      Very good family support  .  Kids in town  Non smoker no alcoholic kurtis drinker.   Social History:    Social History     Social History     Marital status:      Spouse name: N/A     Number of children: N/A     Years of education: N/A     Social History Main Topics     Smoking status: Never Smoker     Smokeless tobacco: Never Used     Alcohol use No     Drug use: No     Sexual activity: Not on file     Other Topics Concern     Not on file     Social History Narrative          Review of Systems  unremarkable  There were no vitals filed for this visit.    Physical Exam  VS noted, KAROL stable   Patient is alert, awake, oriented to time, place and person, not in acute cardiorespiratory distress  Skin: Warm, and moist,  no lesions,   Head: atraumatic, normocephalic,   No bleeding from nares, no sinus tenderness  Eyes: EOM's intact and conjugate, pink palpebral conjunctivae, anicteric sclerae, pupils  reactive  Lungs : Clear to auscultation , no crackles, wheezes or rhonchi  Heart : Nornal first and second heart sounds, irreg irregNo murmurs, heaves, or thrills  Abdomen: Soft, non tender, non distended, no hepatosplenomegaly, no ascites  Extremities:(+) bipedal pitting  edema, pulses are full and equal      Medication List:  Current Outpatient Prescriptions   Medication Sig     albuterol (PROVENTIL HFA;VENTOLIN HFA) 90 mcg/actuation inhaler Inhale 2 puffs every 4 (four) hours as needed for wheezing or shortness of breath.     albuterol (PROVENTIL) 2.5 mg /3 mL (0.083 %) nebulizer solution Take 2.5 mg by nebulization every 4 (four) hours as needed for wheezing.     atorvastatin (LIPITOR) 40 MG tablet Take 40 mg by mouth bedtime.     digoxin (LANOXIN) 125 mcg tablet Take 1 tablet (125 mcg total) by mouth daily.     diltiazem (CARDIZEM CD) 180 MG 24 hr capsule Take 180 mg by mouth every morning.      furosemide (LASIX) 40 MG tablet Take 1 tablet (40 mg total) by mouth daily.     glipiZIDE (GLUCOTROL) 10 MG 24 hr tablet Take 10 mg by mouth every morning.      lansoprazole (PREVACID) 30 MG capsule Take 30 mg by mouth every morning.      ondansetron (ZOFRAN) 4 MG tablet Take 1 tablet (4 mg total) by mouth every 8 (eight) hours as needed for nausea.     polyethylene glycol (MIRALAX) 17 gram packet Take 1 packet (17 g total) by mouth daily as needed.     senna-docusate (SENNOSIDES-DOCUSATE SODIUM) 8.6-50 mg tablet Take 1 tablet by mouth daily as needed.      sitaGLIPtin (JANUVIA) 100 MG tablet Take 50 mg by mouth every morning. Takes 1/2 of 100mg tablet for 50mg dose.     spironolactone (ALDACTONE) 25 MG tablet Take 12.5 mg by mouth every morning. Takes 1/2 of 25mg tablet for 12.5mg dose     warfarin (COUMADIN) 4 MG tablet Take 1 tablet (4 mg total) by mouth daily. Restart on Tuesday 3/21/17 - further management by PCP or TCU physicians.     white petrolatum (AQUAPHOR NATURAL HEALING) 41 % Oint Apply small amount to  bilateral inner nostrils with Qtip twice per day for 30 days.       Labs:  Recent Results (from the past 72 hour(s))   INR   Result Value Ref Range    INR 1.24 (H) 0.90 - 1.10     Lab Results   Component Value Date    INR 1.24 (H) 03/23/2017    INR 1.30 (H) 03/18/2017    INR 1.23 (H) 03/17/2017         Assessment:    ICD-10-CM    1. Acute blood loss anemia D62    2. Epistaxis R04.0    3. S/P aortic valve replacement with bioprosthetic valve Z95.4    4. Persistent atrial fibrillation I48.1    5. Constipation K59.00    6. CHF exacerbation I50.9        Plan:  Bleeding has stopped.   Warfarin restarted. Will jcontinue home dose recheck 3.27  Increase lasix to 60 mg daily,   Recheck lytes 3/27  Increase senna to 2 pills BID  Make mirlax daily till BM regular  Watch for recurrence of nose bleeds/melena  Total time spent was 60 minutes with more than 50% spent on counseling, discussion of treatment plan and extensive review of available records  This note has been dictated using voice recognition software. Any grammatical, typographical, or context distortions are unintentional.          Electronically signed by: Karthik Mustafa MD

## 2021-06-10 NOTE — PROGRESS NOTES
[facility]    DATE:4/10/2017    NAME:  Modesto Martinez             :  1932  MRN: [id number]  CODE STATUS:  FULL CODE    VISIT TYPE: DISCHARGE SUMMARY  FACILYTY: [facility] [[id number]]                    PRIMARY CARE PROVIDER: Rik Lobato MD    DISCHARGE DIAGNOSIS:      1. Syncope    2. Persistent atrial fibrillation    3. Bilateral lower extremity edema    4. Chronic diastolic congestive heart failure    5. Type 2 diabetes mellitus with complication, without long-term current use of insulin    6. Essential hypertension with goal blood pressure less than 140/90    7. Obstructive Sleep Apnea    8. Constipation         DISCHARGE MEDICATIONS:       Medication List          These changes are accurate as of: 4/10/17 11:59 PM.  If you have any questions, ask your nurse or doctor.               CHANGE how you take these medications          furosemide 40 MG tablet   Commonly known as:  LASIX   Take 1 tablet (40 mg total) by mouth daily.   What changed:  how much to take         CONTINUE taking these medications          * albuterol 90 mcg/actuation inhaler   Commonly known as:  PROAIR HFA;PROVENTIL HFA;VENTOLIN HFA       * albuterol 2.5 mg /3 mL (0.083 %) nebulizer solution   Commonly known as:  PROVENTIL       atorvastatin 40 MG tablet   Commonly known as:  LIPITOR       CALCIUM 600 + D(3) 600 mg(1,500mg) -400 unit per tablet   Generic drug:  calcium carbonate-vitamin D3       digoxin 125 mcg tablet   Commonly known as:  LANOXIN   Take 1 tablet (125 mcg total) by mouth daily.       diltiazem 180 MG 24 hr capsule   Commonly known as:  CARDIZEM CD       glipiZIDE 10 MG 24 hr tablet   Commonly known as:  GLUCOTROL       lansoprazole 30 MG capsule   Commonly known as:  PREVACID       ondansetron 4 MG tablet   Commonly known as:  ZOFRAN       polyethylene glycol 17 gram packet   Commonly known as:  MIRALAX   Take 1 packet (17 g total) by mouth daily as needed.       *  sennosides-docusate sodium 8.6-50 mg tablet   Generic drug:  senna-docusate       * senna-docusate 8.6-50 mg tablet   Commonly known as:  PERICOLACE       sitaGLIPtin 100 MG tablet   Commonly known as:  JANUVIA       spironolactone 25 MG tablet   Commonly known as:  ALDACTONE       VITAMIN D2 50,000 unit capsule   Generic drug:  ergocalciferol       warfarin 4 MG tablet   Commonly known as:  COUMADIN   Take 1 tablet (4 mg total) by mouth daily. Restart on Tuesday 3/21/17 - further management by PCP or TCU physicians.       white petrolatum 41 % Oint   Commonly known as:  AQUAPHOR NATURAL HEALING   Apply small amount to bilateral inner nostrils with Qtip twice per day for 30 days.       * Notice:  This list has 4 medication(s) that are the same as other medications prescribed for you. Read the directions carefully, and ask your doctor or other care provider to review them with you.        HISTORY OF PRESENT ILLNESS: Modesto Martinez is a 85 y.o. male who has a  medical history for AS s/p prosthetic aortic valve as well as atrial flutter on Coumadin who presented to this Emergency Department for evaluation of nosebleed.  He reported rubbing his nose while watching TV and sustained onset of bleeding from right nares. Packing was placed, as bleeding could not be controlled with pressure and Epinephrine. After Rhino rocket was placed, he was placed on Keflex and sent home.   He returned to the hospital because of weakness, melena, diarrhea, and passing out in the bathroom.  CT of the head showed no signs of intracranial bleed. Hemoglobin was found to be 9.9 which is an acute drop from 13.1. Platelets slightly lowered at 131 versus 140.  BMP relatively normal barring acutely elevated BUN of 43 from a year ago and elevated glucose of 193. Magnesium and troponin normal. INR therapeutic at 2.06. EKGs showed Atrial fibrillation.  He received Vitamin K, Coumadin, and had 1 unit of PRBC.   GI was consulted upon admission given  concern for possible GI bleed, reported most likely from epistaxis but if melena persisted after epistaxis resolves then would consider EGD vs esophagram given reports of dysphagia. Pt with significant orthostasis on 3/17 so transfused 1U pRBCs and improvement in dizziness. Hemoglobin stable and >9 following INR reversal and transfusion. Nasal packing removed at bedside on 3/18/17 without complications and no further bleeding. Restarted  Warfarin on 3/21 at 4mg daily, his home dose, with follow up INRs at TCU and management.    SKILLED NURSING FACILITY COURSE:  During this TCU stay, patient completed all anticipated goals of therapy.  Epistaxis - See above. Resolved after rhinorocket placed,  IV Vitamin K for INR reversal, and Warfarin was held resulting in resolution of bleeding.  Melena - Bleeding has stopped with No blood noted in the stool. Syncope - No recurrence since admission.  Persistent atrial fibrillation - INRs therapeutic, anticoagulated with Coumadin with a goal of 2-3. He also on Cardizem.  Bilateral lower extremity edema - Diabetic socks, refuses SAMANTA hose and Ace wraps, instructed to continue to elevate feet throughout the day.  Chronic diastolic congestive heart failure - Weights stable, increased Lasix to 60 mg daily and Spironolactone. Last Echo showed EF of 60% in February 2016.    Type 2 diabetes mellitus with complication, without long-term current use of insulin -  Blood sugars are within target range, will continue Glucotrol and Januvia.  Last Hgb A1c is 6.4%. BGMs checked daily, alternating times.  Essential hypertension with goal blood pressure less than 140/90 - Blood pressures are within target range, continues on Aldactone and Lasix  Obstructive Sleep Apnea - Not on CPAP  Constipation - BMs are now regular.  Miralax was changed  to prn and increase Senna S to 2 tabs BID  Acute blood loss anemia - Last Hgb 9.3    PHYSICAL EXAMINATION:  Vitals:    04/10/17 2312   BP: 148/62   Pulse: 88    Resp: 18   Temp: 97.3  F (36.3  C)   SpO2: 96%   Weight: (!) 247 lb (112 kg)     Patient is alert, awake, oriented to time, place and person, not in acute cardiorespiratory distress  Skin: Warm, and moist,  no lesions,   Head: atraumatic, normocephalic,   No bleeding from nares, no sinus tenderness  Eyes: EOM's intact and conjugate, pink palpebral conjunctivae, anicteric sclerae, pupils reactive  Lungs : Clear to auscultation , no crackles, wheezes or rhonchi  Heart : Nornal first and second heart sounds, irreg irregNo murmurs, heaves, or thrills  Abdomen: Soft, non tender, non distended, no hepatosplenomegaly, no ascites  Extremities:(+) bipedal pitting  edema, pulses are full and equal    LABS:      Lab Results   Component Value Date    WBC 4.9 03/27/2017    HGB 9.3 (L) 03/27/2017    HCT 29.3 (L) 03/27/2017    MCV 99 03/27/2017     (L) 03/27/2017     Results for orders placed or performed in visit on 03/27/17   Basic Metabolic Panel   Result Value Ref Range    Sodium 138 136 - 145 mmol/L    Potassium 3.7 3.5 - 5.0 mmol/L    Chloride 102 98 - 107 mmol/L    CO2 29 22 - 31 mmol/L    Anion Gap, Calculation 7 5 - 18 mmol/L    Glucose 90 70 - 125 mg/dL    Calcium 8.8 8.5 - 10.5 mg/dL    BUN 11 8 - 28 mg/dL    Creatinine 1.09 0.70 - 1.30 mg/dL    GFR MDRD Af Amer >60 >60 mL/min/1.73m2    GFR MDRD Non Af Amer >60 >60 mL/min/1.73m2       Lab Results   Component Value Date    TSH 2.64 04/03/2017     Vitamin D, Total (25-Hydroxy)   Date Value Ref Range Status   04/03/2017 6.6 (L) 30.0 - 80.0 ng/mL Final     Lab Results   Component Value Date    [id number] 274 04/03/2017       DISCHARGE PLAN: I certify that this patient is under my care and and the NP working with me, had a face-to-face encounter that meets the physician face-to-face encounter requirements.  The encounter was in whole, or part related to the primary reason for home health.  The patient is confined to his home and needs intermittent skilled nursing,  PT, RN, and HHA..  The patient has been under my care and I have initiated the establishment of the plan of care.    The Patient is homebound due to: taxing and considerable effort for patient to leave the home and is dependent on others for transportation.  Patient will discharge to home with home care, current medications  and treatment orders. Home care to include PT, RN, and HHA.     Patient to be followed by home care for nursing services for medication set up and teaching, symptom and disease processes monitoring and education.  Patient to be followed by home care for physical therapy to eval and treat for strengthening, balance, endurance, and safety with mobility, and ambulation  Patient to be followed by home care for home health aid services for bathing and ADL needs  Planned discharge.  All therapy goals have been met.  Family will assist with discharge and transportation.    Patient will follow up with PCP within 7-10 days after discharge for medication mangagment and appropriate lab studies.  Vitamin D to be checked on 6/29/2017                Electronically signed by:  Dario Santillan CNP      For documentation purposes, chart review, medication management, and discharge coordination of care was greater than 35 minutes

## 2021-06-13 ENCOUNTER — HEALTH MAINTENANCE LETTER (OUTPATIENT)
Age: 86
End: 2021-06-13

## 2021-06-15 PROBLEM — R55 SYNCOPE: Status: ACTIVE | Noted: 2017-03-17

## 2021-06-15 PROBLEM — K92.1 MELENA: Status: ACTIVE | Noted: 2017-03-17

## 2021-06-15 PROBLEM — D62 ACUTE BLOOD LOSS ANEMIA: Status: ACTIVE | Noted: 2017-03-17

## 2021-06-16 PROBLEM — R42 DIZZINESS: Status: ACTIVE | Noted: 2019-07-13

## 2021-06-16 PROBLEM — I50.9 ACUTE EXACERBATION OF CHF (CONGESTIVE HEART FAILURE) (H): Status: ACTIVE | Noted: 2018-07-17

## 2021-06-16 PROBLEM — I50.41 ACUTE COMBINED SYSTOLIC AND DIASTOLIC CHF, NYHA CLASS 1 (H): Status: ACTIVE | Noted: 2019-06-21

## 2021-06-16 PROBLEM — Z71.89 ACP (ADVANCE CARE PLANNING): Status: ACTIVE | Noted: 2019-08-09

## 2021-06-16 PROBLEM — G45.9 TIA (TRANSIENT ISCHEMIC ATTACK): Status: ACTIVE | Noted: 2019-07-13

## 2021-06-16 PROBLEM — I50.9 CHF EXACERBATION (H): Status: ACTIVE | Noted: 2018-07-17

## 2021-06-16 PROBLEM — R29.6 FREQUENT FALLS: Status: ACTIVE | Noted: 2019-08-02

## 2021-06-19 NOTE — LETTER
Letter by Sarahi Michelle CNP at      Author: Sarahi Michelle CNP Service: -- Author Type: --    Filed:  Encounter Date: 2019 Status: (Other)         Patient: Modesto Martinez   MR Number: [id number]   YOB: 1932   Date of Visit: 2019       [facility]      NAME:  Modesto Martinez             :  1932    MRN: [id number]    CODE STATUS:      FACILITY: [facility] [[id number]]         CHIEF COMPLAIN/REASON FOR VISIT:  Chief Complaint   Patient presents with   ? Review Of Multiple Medical Conditions       HISTORY OF PRESENT ILLNESS: Modesto Martinez is a 87 y.o. male being seen for review of multiple medical conditions.  He comes to the TCU from [facility] where he was admitted from  to 2019  Due to frequently falling at home.Upon  Admission he was dehydrated and felt it was due to his high doses of diuretic.  PMH also includes : Afib, valve rplacement, and TIA. He is seen in room up in . We discussed TCU stay, he reports he is familiar with routines. He has bilateral leg edema, wears home compression socks, REFUSES teds. On coumadin , no excessive bleeding bruising or bleeding.    Allergies   Allergen Reactions   ? Levaquin [Levofloxacin]      Tendonitis   :     Current Outpatient Medications   Medication Sig   ? acetaminophen (TYLENOL) 325 MG tablet Take 2 tablets (650 mg total) by mouth every 4 (four) hours as needed for pain or fever.   ? atorvastatin (LIPITOR) 40 MG tablet Take 1 tablet (40 mg total) by mouth at bedtime. lipids   ? cholecalciferol, vitamin D3, 1,000 unit tablet Take 1 tablet (1,000 Units total) by mouth at bedtime. General health (Patient taking differently: Take 1,000 Units by mouth daily. General health      )   ? cyanocobalamin 1000 MCG tablet Take 1 tablet (1,000 mcg total) by mouth at bedtime. b12 def (Patient taking differently: Take 1,000 mcg by mouth daily. b12 def      )   ? digoxin (LANOXIN) 125  mcg tablet Take 1 tablet (125 mcg total) by mouth daily with supper. afib   ? diltiazem (CARDIZEM CD) 120 MG 24 hr capsule Take 1 capsule (120 mg total) by mouth every morning. afib   ? ferrous sulfate 325 (65 FE) MG tablet Take 1 tablet by mouth 2 (two) times a day.          ? furosemide (LASIX) 40 MG tablet Take 1 tablet (40 mg total) by mouth 2 (two) times a day at 9am and 6pm.   ? glipiZIDE (GLUCOTROL) 5 MG tablet Take 5 mg by mouth daily with breakfast.   ? lansoprazole (PREVACID) 30 MG capsule Take 1 capsule (30 mg total) by mouth every morning. gerd   ? nitroglycerin (NITROSTAT) 0.4 MG SL tablet Place 1 tablet (0.4 mg total) under the tongue every 5 (five) minutes as needed for chest pain.   ? senna-docusate (PERICOLACE) 8.6-50 mg tablet Take 1 tablet by mouth 2 (two) times a day as needed for constipation.          ? spironolactone (ALDACTONE) 25 MG tablet Take 0.5 tablets (12.5 mg total) by mouth every morning. chf   ? warfarin (COUMADIN/JANTOVEN) 3 MG tablet Take 3-4.5 mg by mouth See Admin Instructions. 8/6/19 INR 2.28 3mg M, W, F and 4.5mg AOD. Next INR 8/8  4.5 mg on T, Th, Sa, Sun  3 mg on MWF               REVIEW OF SYSTEMS:    Currently, no fever, chills, or rigors. Does not have any visual problems,  hearing problems uses HA. Denies any chest pain, headaches, palpitations, lightheadedness, dizziness, shortness of breath, or cough. Appetite is good. Denies any GERD symptoms. Denies any difficulty with swallowing, nausea, or vomiting.  Denies any abdominal pain, diarrhea or constipation. Denies any urinary symptoms. No insomnia. No active bleeding. No rash.       PHYSICAL EXAMINATION:  Vitals:    08/09/19 0506   BP: 125/53   Pulse: 82   Temp: 98.2  F (36.8  C)   Weight: 198 lb 6.4 oz (90 kg)         GENERAL: Awake, Alert, oriented x3, not in any form of acute distress, answers questions appropriately, follows simple commands, conversant  HEENT: Head is normocephalic with normal hair distribution. No  evidence of trauma. Ears: No acute purulent discharge. Eyes: Conjunctivae pink with no scleral jaundice. Nose: Normal mucosa and septum. NECK: Supple with no cervical or supraclavicular lymphadenopathy. Trachea is midline.   CHEST: No tenderness or deformity, no crepitus  LUNG: Clear to auscultation with good chest expansion. There are no crackles or wheezes, normal AP diameter.  BACK: No kyphosis of the thoracic spine. Symmetric, no curvature, ROM normal, no CVA tenderness, no spinal tenderness   CVS: There is good S1  S2, rhythm is regular.  ABDOMEN: Globular and soft, nontender to palpation, non distended, no masses, no organomegaly, good bowel sounds, no rebound or guarding, no peritoneal signs.   EXTREMITIES: Atraumatic. Full range of motion on both upper and lower extremities, there is no tenderness to palpation, + for  pedal edema, no cyanosis or clubbing, no calf tenderness, normal cap refill, aged arthritic  joint swelling.  SKIN: Warm and dry, no erythema noted, no rashes or lesions.Staff reports stage 2 to sacral, dressed, unable to assess  NEUROLOGICAL: The patient is oriented to person, place and time. Strength and sensation are grossly intact. Face is symmetric.            LABS:    Lab Results   Component Value Date    WBC 6.6 08/04/2019    HGB 9.6 (L) 08/04/2019    HCT 32.0 (L) 08/04/2019    MCV 91 08/04/2019     08/04/2019       Results for orders placed or performed during the hospital encounter of 08/02/19   Basic Metabolic Panel   Result Value Ref Range    Sodium 139 136 - 145 mmol/L    Potassium 4.7 3.5 - 5.0 mmol/L    Chloride 99 98 - 107 mmol/L    CO2 31 22 - 31 mmol/L    Anion Gap, Calculation 9 5 - 18 mmol/L    Glucose 115 70 - 125 mg/dL    Calcium 9.0 8.5 - 10.5 mg/dL    BUN 37 (H) 8 - 28 mg/dL    Creatinine 1.26 0.70 - 1.30 mg/dL    GFR MDRD Af Amer >60 >60 mL/min/1.73m2    GFR MDRD Non Af Amer 54 (L) >60 mL/min/1.73m2           Lab Results   Component Value Date    HGBA1C 6.9 (H)  06/22/2019     Vitamin D, Total (25-Hydroxy)   Date Value Ref Range Status   04/03/2017 6.6 (L) 30.0 - 80.0 ng/mL Final     Lab Results   Component Value Date    [id number] >2,000 (H) 08/02/2019       ASSESSMENT/PLAN:  1. Frequent falls    2. Anticoagulated on Coumadin    3. Type 2 diabetes mellitus with complication, without long-term current use of insulin (H)    4. ACP (advance care planning)      1. Frequent Falls: Patient with history of frequent falls was weak at home and continued with falls in TCU now for strengthening and endurance.  To continue working with PT and OT and skilled nursing services to monitor chronic medical conditions.    2.  Anticoagulation on Coumadin: We will continue 4.5 mg of Coumadin every Tuesday Thursday Saturday and Sunday and 3 mg all other days of the week.  Recheck INR 8/12/2019.    3. DM: On Glypizide initiate blood glucose checking twice daily. So we will initiate a providers can monitor during visit.    CBC, BMP, mag level on 8/12/2019 diagnosis related is increased edema and diabetes.    4. ACP: Greater then 16 minutes spent face to face with pt discussing advanced care planning and POLST was signed as DNR this am.    Electronically signed by:  Sarahi Michelle CNP  This progress note was completed using Dragon software and there may be grammatical errors.

## 2021-06-19 NOTE — LETTER
Letter by Carlos Delgado MD at      Author: Carlos Delgado MD Service: -- Author Type: --    Filed:  Encounter Date: 7/11/2019 Status: (Other)         Patient: Modesto Martinez   MR Number: [id number]   YOB: 1932   Date of Visit: 7/11/2019     [facility]    Facility:   [facility] [[id number]]   Code Status: DNR  PCP: Rik Lobato MD   Phone: [phone]   Fax: [phone]      CHIEF COMPLAINT/REASON FOR VISIT:  Chief Complaint   Patient presents with   ? Discharge Summary       HISTORY COURSE:  Mr. Modesto Martinez is an 87-year-old man who was most recently hospitalized for acute on chronic congestive heart failure.  He has other underlying medical conditions of diabetes mellitus, atrial fibrillation, anemia, chronic kidney disease, stage III, and status post aortic valve replacement with bioprosthetic valve.    During his time in transitional care unit his hemoglobin was 9.7 on 7/11 and the INR was 2.14.  His dose of Coumadin at time of discharge is 4.5 mg on Monday Wednesday Friday and 3 mg on Tuesday Thursday Saturday Sunday.  The plan is to recheck INR in 1 week.  Also his glucometer readings were decreased to just once daily and his most recent blood glucose was 120.  He is not on insulin.  On review of systems he has not had any new problems such as fevers or chills or cough or shortness of breath beyond his baseline.  He is not experiencing chest pain or palpitations of the heart.  He does not have abdominal pain or nausea.    Review of Systems    Vitals:    07/11/19 1257   BP: 119/53   Pulse: 77   Resp: 18   Temp: 98  F (36.7  C)   SpO2: 97%       Physical Exam   Constitutional: No distress.   HENT:   Nose: Nose normal.   Eyes: Right eye exhibits no discharge. Left eye exhibits no discharge.   Neck: No JVD present.   Cardiovascular: Normal heart sounds.   Pulmonary/Chest: Breath sounds normal. No respiratory distress.   Neurological: He is alert.    Psychiatric: He has a normal mood and affect.   Nursing note and vitals reviewed.      MEDICATION LIST:  No current outpatient medications on file.       DISCHARGE DIAGNOSIS:    ICD-10-CM    1. Acute on chronic diastolic congestive heart failure (H) I50.33    2. Persistent atrial fibrillation (H) I48.1    3. Type 2 diabetes mellitus with complication, without long-term current use of insulin (H) E11.8    4. Anemia, unspecified type D64.9    5. Chronic renal disease, stage III (H) N18.3    6. S/P aortic valve replacement with bioprosthetic valve Z95.3        MEDICAL EQUIPMENT NEEDS:  No new durable medical equipment.    DISCHARGE PLAN/FACE TO FACE:  I certify that services are/were furnished while this patient was under the care of a physician and that a physician or an allowed non-physician practitioner (NPP), had a face-to-face encounter that meets the physician face-to-face encounter requirements. The encounter was in whole, or in part, related to the primary reason for home health. The patient is confined to his/her home and needs intermittent skilled nursing, physical therapy, speech-language pathology, or the continued need for occupational therapy. A plan of care has been established by a physician and is periodically reviewed by a physician.  Date of Face-to-Face Encounter: 7/11/2019.    I certify that, based on my findings, the following services are medically necessary home health services: Skilled nursing, home health aide, home occupational therapy, and home physical therapy.    My clinical findings support the need for the above skilled services because: (Please write a brief narrative summary that describes what the RN, PT, SLP, or other services will be doing in the home. A list of diagnoses in this section does not meet the CMS requirements.)  Skilled nursing visits are for medication set up and teaching, symptom and disease process monitoring and education.  Home health aide services are for  bathing and ADL needs.  Home physical therapy is to evaluate and treat for strengthening, balance, endurance, and safety with mobility and ambulation.  Home occupational therapy is to evaluate and treat for strengthening, ADL needs, adaptive equipment and safety.    This patient is homebound because: (Please write a brief narrative summary describing the functional limitations as to why this patient is homebound and specifically what makes this patient homebound.)  He continues to have weakness and unsteadiness of gait.    The patient is, or has been, under my care and I have initiated the establishment of the plan of care. This patient will be followed by a physician who will periodically review the plan of care.    Schedule follow up visit with primary care provider within 7 days to reestablish care.    Electronically signed by: Carlos Delgado MD

## 2021-06-19 NOTE — LETTER
Letter by Dario Santillan CNP at      Author: Dario Santillan CNP Service: -- Author Type: --    Filed:  Encounter Date: 2019 Status: (Other)         Patient: Modesto Martinez   MR Number: [id number]   YOB: 1932   Date of Visit: 2019          [facility]    NAME:  Modesto Martinez             :  1932  MRN: [id number]  CODE STATUS:  DNR    FACILITY:  [facility] [[id number]]       ROOM:   239    CHIEF COMPLAINT/REASON FOR VISIT:  Chief Complaint   Patient presents with   ? Problem Visit     Atrial Fibrillation and Anemia     HISTORY OF PRESENT ILLNESS: Modesto Martinez is a 87 y.o. male with history of atrial fibrillation diastolic and systolic CHF and aortic valve replacement presents to [facility] as a direct admission from cardiology clinic due to inability to fully manage heart failure in the outpatient setting.      1.Acute combined systolic and diastolic CHF, NYHA class 1  -Cardiology following, appreciate input  -Has been on iv lasix at 80mg IV two times a day, spironolactone. Today cards just switched to po lasix at high dose of 80 mg two times a day, will stay on this per cards  -leg edema better, more so on left leg, so I did check u/son right-neg for dvt, not today right leg is softer  -Monitor I/O, daily weight , weight trending down and diuresing well  -will need close f/u with heart failure clinic after dc, such as in 3-5 days     2. Obstructive Sleep Apnea -continue CPAP     3. Type 2 diabetes mellitus with complication, without long-term current use of insulin (H) -continue home medications with sliding scale  -I will hold glip due to low bs, I think he will be checked with SS at TCU and diet control test. I think he has been running too tight     4.Anticoagulated on Coumadin -pharmacy to dose Coumadin  -daily inr     5.S/P aortic valve replacement with bioprosthetic valve      6.Persistent atrial  fibrillation (H) -continue diltiazem,dig,warfarin     7.Ear lesion- has numerous scabs ,and he notes he picks at them at night. WOC did see      8.Anemia  -I am not sure if this is renal driven, no sign of loss, other than he scratches at scabs on face  -I called daughter, Katelynn, who is a retired NP and discussed this. He has never had colonoscopy and would never agree  -there is a chance malignancy could play a factor here, daughter ok not working up     9.Scabs on face  -I did share my concern that some of the lesions maybe skin cancer  -he refuses to have anything removed, daughter aware     10.DVT prevent-coumadin   Patient was stabilized and transferred to TCU for continued rehabilitation.    Today, patient is seen at the beside.  He has some open area on his RUE that is malodorous with green drainage.  Afebrile.  No pain.  Bilateral lower extremity edema.  Anticoagulated with Coumadin for Atrial Fibrillatin.  INR today 2.40. Last Hgb 9.3.  No active bleeding.     Past Medical History:   Diagnosis Date   ? Aortic stenosis    ? Arthritis     knees   ? Atrial fibrillation (H)    ? CAD (coronary artery disease)    ? CHF (congestive heart failure) (H)    ? Diabetes mellitus, type II (H)    ? HTN (hypertension)    ? Hyperlipidemia     controlled on medications   ? KRISHAN (obstructive sleep apnea)     no CPAP     Past Surgical History:   Procedure Laterality Date   ? BACK SURGERY  1970    L5   ? CARDIAC VALVE SURGERY      cow valve   ? EYE SURGERY Bilateral     cataracts     Family History   Problem Relation Age of Onset   ? Cancer Mother    ? Diabetes Father      Social History     Socioeconomic History   ? Marital status:      Spouse name: Not on file   ? Number of children: Not on file   ? Years of education: Not on file   ? Highest education level: Not on file   Occupational History   ? Not on file   Social Needs   ? Financial resource strain: Not on file   ? Food insecurity:     Worry: Not on file      Inability: Not on file   ? Transportation needs:     Medical: Not on file     Non-medical: Not on file   Tobacco Use   ? Smoking status: Never Smoker   ? Smokeless tobacco: Never Used   Substance and Sexual Activity   ? Alcohol use: No   ? Drug use: No   ? Sexual activity: Not on file   Lifestyle   ? Physical activity:     Days per week: Not on file     Minutes per session: Not on file   ? Stress: Not on file   Relationships   ? Social connections:     Talks on phone: Not on file     Gets together: Not on file     Attends Bahai service: Not on file     Active member of club or organization: Not on file     Attends meetings of clubs or organizations: Not on file     Relationship status: Not on file   ? Intimate partner violence:     Fear of current or ex partner: Not on file     Emotionally abused: Not on file     Physically abused: Not on file     Forced sexual activity: Not on file   Other Topics Concern   ? Not on file   Social History Narrative   ? Not on file     Allergies   Allergen Reactions   ? Levaquin [Levofloxacin]      Tendonitis     Current Outpatient Medications   Medication Sig Dispense Refill   ? acetaminophen (TYLENOL) 325 MG tablet Take 2 tablets (650 mg total) by mouth every 4 (four) hours as needed for pain or fever. 20 tablet 0   ? atorvastatin (LIPITOR) 40 MG tablet Take 1 tablet (40 mg total) by mouth at bedtime. lipids 30 tablet 0   ? cholecalciferol, vitamin D3, 1,000 unit tablet Take 1 tablet (1,000 Units total) by mouth at bedtime. General health 30 tablet 0   ? cyanocobalamin 1000 MCG tablet Take 1 tablet (1,000 mcg total) by mouth at bedtime. b12 def 30 tablet 0   ? digoxin (LANOXIN) 125 mcg tablet Take 1 tablet (125 mcg total) by mouth daily with supper. afib 30 tablet 0   ? diltiazem (CARDIZEM CD) 120 MG 24 hr capsule Take 1 capsule (120 mg total) by mouth every morning. afib 30 capsule 0   ? ferrous sulfate 325 (65 FE) MG tablet Take 1 tablet by mouth daily.     ? furosemide  (LASIX) 80 MG tablet Take 1 tablet (80 mg total) by mouth 2 (two) times a day at 9am and 6pm. For chf 60 tablet 0   ? lansoprazole (PREVACID) 30 MG capsule Take 1 capsule (30 mg total) by mouth every morning. gerd 30 capsule 0   ? NOVOLOG FLEXPEN U-100 INSULIN 100 unit/mL (3 mL) injection pen Check blood sugar three (3) times daily.  11.9 Type 2 without complications  BD Ultra-fine Shalonda Pen Needles - NDC [address]-69 - dispense 1 case, refill PRN for 1 year  Microlet Color Lancets (100s) - dispense 1, refill PRN for 1 year 5 Pre-filled Pen Syringe PRN   ? spironolactone (ALDACTONE) 25 MG tablet Take 0.5 tablets (12.5 mg total) by mouth every morning. chf 30 tablet 0   ? warfarin (COUMADIN/JANTOVEN) 3 MG tablet Take 1 tablet (3 mg total) by mouth See Admin Instructions. Take 1 tablet (3 mg) on T, R, Sat, Sun  Take 1.5 tablets (4.5 mg) on MWF, Needs Daily inr and goal of inr is 2-3, call MD daily for confirmed dosing (Patient taking differently: Take 3 mg by mouth See Admin Instructions. 7/1/19 INR 2.21 Cont 4.5mg M-W-F, 3mg AOD. Next INR 7/8.  6/28/19 INR 2.16 Take 4.5mg M, W, F and 3mg AOD Next INR 7/1  Take 1 tablet (3 mg) on T, R, Sat, Sun  Take 1.5 tablets (4.5 mg) on MWF, Needs Daily inr and goal of inr is 2-3, call MD daily for confirmed dosing      ) 40 tablet 0     No current facility-administered medications for this visit.      REVIEW OF SYSTEMS:    Currently, no fever, chills, or rigors. Does not have any visual or hearing problems. Denies any chest pain, headaches, palpitations, lightheadedness, dizziness, shortness of breath, or cough. Appetite is good. Denies any GERD symptoms. Denies any difficulty with swallowing, nausea, or vomiting.  Denies any abdominal pain, diarrhea or constipation. Denies any urinary symptoms. No insomnia. No active bleeding. No rash.     PHYSICAL EXAMINATION:  Vitals:    07/10/19 1644   BP: 125/66   Pulse: 87   Resp: 18   Temp: 97.9  F (36.6  C)   SpO2: 92%   Weight: 210 lb  14.4 oz (95.7 kg)       GENERAL: Awake, Alert, oriented x3, not in any form of acute distress, answers questions appropriately, follows simple commands, conversant  HEENT: Head is normocephalic with normal hair distribution. No evidence of trauma. Ears: No acute purulent discharge. Eyes: Conjunctivae pink with no scleral jaundice. Nose: Normal mucosa and septum. NECK: Supple with no cervical or supraclavicular lymphadenopathy. Trachea is midline.   CHEST: No tenderness or deformity, no crepitus  LUNG: Clear to auscultation with good chest expansion. There are no crackles or wheezes, normal AP diameter.  BACK: No kyphosis of the thoracic spine. Symmetric, no curvature, ROM normal, no CVA tenderness, no spinal tenderness   CVS: There is good S1  S2, there are no murmurs, rubs, gallops, or heaves, rhythm is regular,  2+ pulses symmetric in all extremities.  ABDOMEN: Globular and soft, nontender to palpation, non distended, no masses, no organomegaly, good bowel sounds, no rebound or guarding, no peritoneal signs.   EXTREMITIES:  Full range of motion on both upper and lower extremities, there is no tenderness to palpation, bilateral lower extremity edema, no cyanosis or clubbing, no calf tenderness.  Pulses equal in all extremities, normal cap refill, no joint swelling.  SKIN: Warm and dry, no erythema noted.  Skin color, texture, no rashes or lesions.  NEUROLOGICAL: The patient is oriented to person, place and time. Strength and sensation are grossly intact. Face is symmetric.    LABS:      Lab Results   Component Value Date    WBC 7.8 07/03/2019    HGB 9.3 (L) 07/03/2019    HCT 31.7 (L) 07/03/2019    MCV 91 07/03/2019     07/03/2019     Results for orders placed or performed in visit on 07/08/19   Basic Metabolic Panel   Result Value Ref Range    Sodium 139 136 - 145 mmol/L    Potassium 4.3 3.5 - 5.0 mmol/L    Chloride 101 98 - 107 mmol/L    CO2 29 22 - 31 mmol/L    Anion Gap, Calculation 9 5 - 18 mmol/L     Glucose 101 70 - 125 mg/dL    Calcium 9.4 8.5 - 10.5 mg/dL    BUN 34 (H) 8 - 28 mg/dL    Creatinine 1.29 0.70 - 1.30 mg/dL    GFR MDRD Af Amer >60 >60 mL/min/1.73m2    GFR MDRD Non Af Amer 53 (L) >60 mL/min/1.73m2     Lab Results   Component Value Date    HGBA1C 6.9 (H) 06/22/2019     Vitamin D, Total (25-Hydroxy)   Date Value Ref Range Status   04/03/2017 6.6 (L) 30.0 - 80.0 ng/mL Final     Lab Results   Component Value Date    [id number] 274 04/03/2017     ASSESSMENT/PLAN:    1. Cellulitis of right upper extremity - Start Keflex 500 mg three times a day for 5 days and Lactobacillus three times a day   2. Anemia, unspecified type - Increase Ferrous sulfate 325 mg two times a day and start Ascorbic acid two times a day    3. Acute on chronic diastolic congestive heart failure (H) - No overt signs and symptoms fo decompensation will continue Lasix and Spironolactone, daily weights, and low sodium diet   4. Bilateral lower extremity edema  - Continue Lasix and start SAMANTA hose - on in am and off in pm   5. Persistent atrial fibrillation (H) - INR 2.40, give Coumadin 3 mg daily and check INR on 7/11/2019               Electronically signed by:  Dario Santillan CNP    Total time spent on the unit was 40 minutes of which 25 minutes was spent in counseling  Atrial Fibrillation and Coumadin dosing, Cellulitis and antibitoi therapy, and Anemia hose and coordination of care of the above plan with nursing staff, patient, and therapy.

## 2021-06-19 NOTE — LETTER
Letter by Dario Santillan CNP at      Author: Dario Santillan CNP Service: -- Author Type: --    Filed:  Encounter Date: 2019 Status: (Other)         Patient: Modesto Martinez   MR Number: [id number]   YOB: 1932   Date of Visit: 2019          [facility]    NAME:  Modesto Martinez             :  1932  MRN: [id number]  CODE STATUS:  DNR    FACILITY:  [facility] [[id number]]       ROOM:   239    CHIEF COMPLAINT/REASON FOR VISIT:  Chief Complaint   Patient presents with   ? Problem Visit     CHF, DM2, Bilateral lower extremity edema     HISTORY OF PRESENT ILLNESS: Modesto Martinez is a 87 y.o. male with history of atrial fibrillation diastolic and systolic CHF and aortic valve replacement presents to [facility] as a direct admission from cardiology clinic due to inability to fully manage heart failure in the outpatient setting.      1.Acute combined systolic and diastolic CHF, NYHA class 1  -Cardiology following, appreciate input  -Has been on iv lasix at 80mg IV two times a day, spironolactone. Today cards just switched to po lasix at high dose of 80 mg two times a day, will stay on this per cards  -leg edema better, more so on left leg, so I did check u/son right-neg for dvt, not today right leg is softer  -Monitor I/O, daily weight , weight trending down and diuresing well  -will need close f/u with heart failure clinic after dc, such as in 3-5 days     2. Obstructive Sleep Apnea -continue CPAP     3. Type 2 diabetes mellitus with complication, without long-term current use of insulin (H) -continue home medications with sliding scale  -I will hold glip due to low bs, I think he will be checked with SS at TCU and diet control test. I think he has been running too tight     4.Anticoagulated on Coumadin -pharmacy to dose Coumadin  -daily inr     5.S/P aortic valve replacement with bioprosthetic  valve      6.Persistent atrial fibrillation (H) -continue diltiazem,dig,warfarin     7.Ear lesion- has numerous scabs ,and he notes he picks at them at night. WOC did see      8.Anemia  -I am not sure if this is renal driven, no sign of loss, other than he scratches at scabs on face  -I called daughter, Katelynn, who is a retired NP and discussed this. He has never had colonoscopy and would never agree  -there is a chance malignancy could play a factor here, daughter ok not working up     9.Scabs on face  -I did share my concern that some of the lesions maybe skin cancer  -he refuses to have anything removed, daughter aware     10.DVT prevent-coumadin   Patient was stabilized and transferred to TCU for continued rehabilitation.       Past Medical History:   Diagnosis Date   ? Aortic stenosis    ? Arthritis     knees   ? Atrial fibrillation (H)    ? CAD (coronary artery disease)    ? CHF (congestive heart failure) (H)    ? Diabetes mellitus, type II (H)    ? HTN (hypertension)    ? Hyperlipidemia     controlled on medications   ? KRISHAN (obstructive sleep apnea)     no CPAP     Past Surgical History:   Procedure Laterality Date   ? BACK SURGERY  1970    L5   ? CARDIAC VALVE SURGERY      cow valve   ? EYE SURGERY Bilateral     cataracts     Family History   Problem Relation Age of Onset   ? Cancer Mother    ? Diabetes Father      Social History     Socioeconomic History   ? Marital status:      Spouse name: Not on file   ? Number of children: Not on file   ? Years of education: Not on file   ? Highest education level: Not on file   Occupational History   ? Not on file   Social Needs   ? Financial resource strain: Not on file   ? Food insecurity:     Worry: Not on file     Inability: Not on file   ? Transportation needs:     Medical: Not on file     Non-medical: Not on file   Tobacco Use   ? Smoking status: Never Smoker   ? Smokeless tobacco: Never Used   Substance and Sexual Activity   ? Alcohol use: No   ? Drug use:  No   ? Sexual activity: Not on file   Lifestyle   ? Physical activity:     Days per week: Not on file     Minutes per session: Not on file   ? Stress: Not on file   Relationships   ? Social connections:     Talks on phone: Not on file     Gets together: Not on file     Attends Scientologist service: Not on file     Active member of club or organization: Not on file     Attends meetings of clubs or organizations: Not on file     Relationship status: Not on file   ? Intimate partner violence:     Fear of current or ex partner: Not on file     Emotionally abused: Not on file     Physically abused: Not on file     Forced sexual activity: Not on file   Other Topics Concern   ? Not on file   Social History Narrative   ? Not on file     Allergies   Allergen Reactions   ? Levaquin [Levofloxacin]      Tendonitis     Current Outpatient Medications   Medication Sig Dispense Refill   ? acetaminophen (TYLENOL) 325 MG tablet Take 2 tablets (650 mg total) by mouth every 4 (four) hours as needed for pain or fever. 20 tablet 0   ? atorvastatin (LIPITOR) 40 MG tablet Take 1 tablet (40 mg total) by mouth at bedtime. lipids 30 tablet 0   ? cholecalciferol, vitamin D3, 1,000 unit tablet Take 1 tablet (1,000 Units total) by mouth at bedtime. General health 30 tablet 0   ? cyanocobalamin 1000 MCG tablet Take 1 tablet (1,000 mcg total) by mouth at bedtime. b12 def 30 tablet 0   ? digoxin (LANOXIN) 125 mcg tablet Take 1 tablet (125 mcg total) by mouth daily with supper. afib 30 tablet 0   ? diltiazem (CARDIZEM CD) 120 MG 24 hr capsule Take 1 capsule (120 mg total) by mouth every morning. afib 30 capsule 0   ? ferrous sulfate 325 (65 FE) MG tablet Take 1 tablet by mouth daily.     ? furosemide (LASIX) 80 MG tablet Take 1 tablet (80 mg total) by mouth 2 (two) times a day at 9am and 6pm. For chf 60 tablet 0   ? lansoprazole (PREVACID) 30 MG capsule Take 1 capsule (30 mg total) by mouth every morning. gerd 30 capsule 0   ? NOVOLOG FLEXPEN U-100  INSULIN 100 unit/mL (3 mL) injection pen Check blood sugar three (3) times daily.  11.9 Type 2 without complications  BD Ultra-fine Shalonda Pen Needles - NDC [address]-20 - dispense 1 case, refill PRN for 1 year  Microlet Color Lancets (100s) - dispense 1, refill PRN for 1 year 5 Pre-filled Pen Syringe PRN   ? spironolactone (ALDACTONE) 25 MG tablet Take 0.5 tablets (12.5 mg total) by mouth every morning. chf 30 tablet 0   ? warfarin (COUMADIN/JANTOVEN) 3 MG tablet Take 1 tablet (3 mg total) by mouth See Admin Instructions. Take 1 tablet (3 mg) on T, R, Sat, Sun  Take 1.5 tablets (4.5 mg) on MWF, Needs Daily inr and goal of inr is 2-3, call MD daily for confirmed dosing (Patient taking differently: Take 3 mg by mouth See Admin Instructions. 7/1/19 INR 2.21 Cont 4.5mg M-W-F, 3mg AOD. Next INR 7/8.  6/28/19 INR 2.16 Take 4.5mg M, W, F and 3mg AOD Next INR 7/1  Take 1 tablet (3 mg) on T, R, Sat, Sun  Take 1.5 tablets (4.5 mg) on MWF, Needs Daily inr and goal of inr is 2-3, call MD daily for confirmed dosing      ) 40 tablet 0     No current facility-administered medications for this visit.      REVIEW OF SYSTEMS:    Currently, no fever, chills, or rigors. Does not have any visual or hearing problems. Denies any chest pain, headaches, palpitations, lightheadedness, dizziness, shortness of breath, or cough. Appetite is good. Denies any GERD symptoms. Denies any difficulty with swallowing, nausea, or vomiting.  Denies any abdominal pain, diarrhea or constipation. Denies any urinary symptoms. No insomnia. No active bleeding. No rash.     PHYSICAL EXAMINATION:  Vitals:    07/03/19 2148   BP: 131/57   Pulse: 82   Resp: 14   Temp: 98  F (36.7  C)   SpO2: 94%   Weight: 208 lb 11.2 oz (94.7 kg)       GENERAL: Awake, Alert, oriented x3, not in any form of acute distress, answers questions appropriately, follows simple commands, conversant  HEENT: Head is normocephalic with normal hair distribution. No evidence of trauma. Ears: No  acute purulent discharge. Eyes: Conjunctivae pink with no scleral jaundice. Nose: Normal mucosa and septum. NECK: Supple with no cervical or supraclavicular lymphadenopathy. Trachea is midline.   CHEST: No tenderness or deformity, no crepitus  LUNG: Clear to auscultation with good chest expansion. There are no crackles or wheezes, normal AP diameter.  BACK: No kyphosis of the thoracic spine. Symmetric, no curvature, ROM normal, no CVA tenderness, no spinal tenderness   CVS: There is good S1  S2, there are no murmurs, rubs, gallops, or heaves, rhythm is regular,  2+ pulses symmetric in all extremities.  ABDOMEN: Globular and soft, nontender to palpation, non distended, no masses, no organomegaly, good bowel sounds, no rebound or guarding, no peritoneal signs.   EXTREMITIES:  Full range of motion on both upper and lower extremities, there is no tenderness to palpation, no pedal edema, no cyanosis or clubbing, no calf tenderness.  Pulses equal in all extremities, normal cap refill, no joint swelling.  SKIN: Warm and dry, no erythema noted.  Skin color, texture, no rashes or lesions.  NEUROLOGICAL: The patient is oriented to person, place and time. Strength and sensation are grossly intact. Face is symmetric.    LABS:      Lab Results   Component Value Date    WBC 7.8 07/03/2019    HGB 9.3 (L) 07/03/2019    HCT 31.7 (L) 07/03/2019    MCV 91 07/03/2019     07/03/2019     Results for orders placed or performed in visit on 06/29/19   Basic Metabolic Panel   Result Value Ref Range    Sodium 137 136 - 145 mmol/L    Potassium 3.9 3.5 - 5.0 mmol/L    Chloride 96 (L) 98 - 107 mmol/L    CO2 35 (H) 22 - 31 mmol/L    Anion Gap, Calculation 6 5 - 18 mmol/L    Glucose 114 70 - 125 mg/dL    Calcium 9.2 8.5 - 10.5 mg/dL    BUN 33 (H) 8 - 28 mg/dL    Creatinine 1.39 (H) 0.70 - 1.30 mg/dL    GFR MDRD Af Amer 59 (L) >60 mL/min/1.73m2    GFR MDRD Non Af Amer 48 (L) >60 mL/min/1.73m2     Lab Results   Component Value Date    HGBA1C  6.9 (H) 06/22/2019     Vitamin D, Total (25-Hydroxy)   Date Value Ref Range Status   04/03/2017 6.6 (L) 30.0 - 80.0 ng/mL Final     Lab Results   Component Value Date    [id number] 274 04/03/2017     ASSESSMENT/PLAN:    1. Acute on chronic diastolic congestive heart failure (H) - Continue Lasix and Spironolactone, daily weights, and low sodium diet   2. Type 2 diabetes mellitus with complication, without long-term current use of insulin (H) - Start Glipizide 5 mg daily and titrate off SSI.  Will add Trandjenta at a later time   3. Bilateral lower extremity edema - Continue Lasix and start SAMANTA hose - on in am and off in pm   4. KRISHAN (obstructive sleep apnea) - Continue CPAP   5. Persistent atrial fibrillation (H) - INR 2.2, give Coumadin 4.5 mg MWF and 3 mg rest of week.  Will check INR on 7/8/2019         Electronically signed by:  Dario Santillan CNP    Total time spent on the unit was 40 minutes of which 25 minutes was spent in counseling  Diabetes Mellitus type 2/Medications/Diet, CHF/Medication/SAMANTA hose and coordination of care of the above plan with nursing staff, patient, and therapy.

## 2021-06-19 NOTE — PROGRESS NOTES
[facility]    DATE: 2018    NAME:  Modesto Martinez             :  1932  MRN: [id number]  CODE STATUS:  FULL CODE    FACILITY:  [facility] [[id number]]       ROOM:   720    CHIEF COMPLAINT/REASON FOR VISIT:  Chief Complaint   Patient presents with     Problem Visit     CHF exacerbation     HISTORY OF PRESENT ILLNESS: Modesto Martinez is a 86 y.o. male with Hypertension - not currently on medications, Type 2 diabetes, Coronary artery disease- replaced aortic valve, CHF with EF of 60%, Hyperlipidemia, and Atrial fibrillation who presented from clinic to the hospital for shortness of breath for the last 3 weeks.     SOB  CHF exacerbation   Patient has had shortness of breath for last 3 weeks.  Has a history of aortic valve replacement and CHF.  Last echo was in  that showed EF of 60%, echo while inpatient shows EF of 56%.  At home he takes Lasix 40 mg once a day, says he never misses a dose because his daughter organizes his meds at his home.  Chest x-ray shows bilateral pleural effusions and vascular congestion.  Likely that the shortness of breath is caused by CHF exacerbation.  A few days ago he had cho mein, with soy sauce, that he has not eaten in months.  Unlikely that patient has had an ischemic event as this is been going on for 3 weeks and troponins were within normal limits.  Unlikely that this is due to PE as he is already anticoagulated for A. fib.  Chest x-ray did not show evidence of pneumonia and he does not appear infectious.  At home patient is on 40 mg of Lasix and Spironolactone, dose not miss doses.  On discharge: resume home Lasix and Spironolactone. Will need follow up BMP 2 days after discharge.      Acute renal failure   Creatinine of 1.66 on admission.. Given his likely CHF exacerbation, this is probably due to cardiorenal syndrome.  Diuresed with IV Furosemide while inpatient. BMP 2 days after discharge.       Supratherapeutic  INR  INR on admission was 5.7. Day of discharge is 2.85.  Patient has had decreased appetite lately so likely this is contributing to change in INR. Held Warfarin while in patient. Day of discharge will start at 2.5 mg daily for 2 days, then will need another INR check to adjust.       Fatigue  Weight loss  Generalized weakness  Patient reports 15-20 pound weight loss in last 2 and half months.  He has also been more fatigued.  He states that he has been choking on food and coughing for the last couple of years. Has had difficulty walking to the bathroom while admitted and feels too weak to brush his teeth. Per RD consult he meets criteria for mild malnutrition. Still feeling fatigued even with therapy for CHF exacerbation. PT/OT recommend TCU. Will need out patient work up for weight loss.      Patient feeling generalized weakness when standing and moving, from bed to bathroom. Did not resolved with diuresing for CHF exacerbation. 5/5 strength of both upper and lower extremities. CK was WNL. Digoxin level WNL TSH level WNL. Will need PT/OT while at TCU, and then further work up for weight loss and generalized weakness.       Chronic issues:  HLD:  Lipitor  Afib: Digoxin and Diltiazem.    Diabetes type II: Glipizide and Linagliptin on discharge.    GERD: Continue prior to admission Prevacid.  Patient was stabilized and transferred to TCU for continue rehabilitation.    Past Medical History:   Diagnosis Date     Aortic stenosis      Arthritis     knees     Atrial fibrillation (H)      CAD (coronary artery disease)      CHF (congestive heart failure) (H)      Diabetes mellitus, type II (H)      HTN (hypertension)      Hyperlipidemia     controlled on medications     KRISHAN (obstructive sleep apnea)     no CPAP     Past Surgical History:   Procedure Laterality Date     BACK SURGERY  1970    L5     CARDIAC VALVE SURGERY      cow valve     EYE SURGERY Bilateral     cataracts     Family History   Problem Relation Age of  Onset     Cancer Mother      Diabetes Father      Social History     Social History     Marital status:      Spouse name: N/A     Number of children: N/A     Years of education: N/A     Occupational History     Not on file.     Social History Main Topics     Smoking status: Never Smoker     Smokeless tobacco: Never Used     Alcohol use No     Drug use: No     Sexual activity: Not on file     Other Topics Concern     Not on file     Social History Narrative     Allergies   Allergen Reactions     Levaquin [Levofloxacin]      Current Outpatient Prescriptions   Medication Sig Dispense Refill     atorvastatin (LIPITOR) 40 MG tablet Take 40 mg by mouth bedtime.       cholecalciferol, vitamin D3, 1,000 unit tablet Take 1,000 Units by mouth daily.       cyanocobalamin 1000 MCG tablet Take 1,000 mcg by mouth daily.       digoxin (LANOXIN) 125 mcg tablet Take 1 tablet (125 mcg total) by mouth daily. 30 tablet 0     diltiazem (CARDIZEM CD) 120 MG 24 hr capsule Take 1 capsule (120 mg total) by mouth every morning. 90 capsule 3     furosemide (LASIX) 40 MG tablet Take 1 tablet (40 mg total) by mouth daily.  0     glipiZIDE (GLUCOTROL) 10 MG 24 hr tablet Take 10 mg by mouth every morning.        lansoprazole (PREVACID) 30 MG capsule Take 30 mg by mouth every morning.        linagliptin 5 mg Tab Take 5 mg by mouth daily.       spironolactone (ALDACTONE) 25 MG tablet Take 12.5 mg by mouth every morning. Takes 1/2 of 25mg tablet for 12.5mg dose       warfarin (COUMADIN) 5 MG tablet Take 0.5 tablets (2.5 mg total) by mouth See Admin Instructions for 2 days. 2.5 mg for 2 days then adjust based on INR check in 2 days. 2 tablet 0     warfarin sodium (WARFARIN DAILY DOSE) Take by mouth daily before supper. 7/24/18 INR 2.1 Take 2.5mg daily Next INR 7/30 7/22/18 INR 2.0 had 2.5mg. No further INR/coumadin orders. Give 2.5mg today. Check INR in am 7/23.  7/20/18 INR 2.85 2.5mg daily.       No current facility-administered medications  for this visit.      REVIEW OF SYSTEMS:    Currently, no fever, chills, or rigors. Does not have any visual or hearing problems. Denies any chest pain, headaches, palpitations, lightheadedness, dizziness, shortness of breath, or cough. Appetite is good. Denies any GERD symptoms. Denies any difficulty with swallowing, nausea, or vomiting.  Denies any abdominal pain, diarrhea or constipation. Denies any urinary symptoms. No insomnia. No active bleeding. No rash.     PHYSICAL EXAMINATION:  Vitals:    07/30/18 1813   BP: 133/78   Pulse: 69   Resp: 20   Temp: 97.8  F (36.6  C)   SpO2: 96%   Weight: (!) 224 lb 12.8 oz (102 kg)       GENERAL: Awake, Alert, oriented x3, not in any form of acute distress, answers questions appropriately, follows simple commands, conversant  HEENT: Head is normocephalic with normal hair distribution. No evidence of trauma. Ears: No acute purulent discharge. Eyes: Conjunctivae pink with no scleral jaundice. Nose: Normal mucosa and septum. NECK: Supple with no cervical or supraclavicular lymphadenopathy. Trachea is midline.   CHEST: No tenderness or deformity, no crepitus  LUNG: Clear to auscultation with good chest expansion. There are no crackles or wheezes, normal AP diameter.  BACK: No kyphosis of the thoracic spine. Symmetric, no curvature, ROM normal, no CVA tenderness, no spinal tenderness   CVS: There is good S1  S2, there are no murmurs, rubs, gallops, or heaves, rhythm is regular,  2+ pulses symmetric in all extremities.  ABDOMEN: Globular and soft, nontender to palpation, non distended, no masses, no organomegaly, good bowel sounds, no rebound or guarding, no peritoneal signs.   EXTREMITIES:  Full range of motion on both upper and lower extremities, there is no tenderness to palpation, no pedal edema, no cyanosis or clubbing, no calf tenderness.  Pulses equal in all extremities, normal cap refill, no joint swelling.  SKIN: Warm and dry, no erythema noted.  Skin color, texture, no  rashes or lesions.  NEUROLOGICAL: The patient is oriented to person, place and time. Strength and sensation are grossly intact. Face is symmetric.    LABS:    Lab Results   Component Value Date    WBC 8.8 07/17/2018    HGB 11.3 (L) 07/17/2018    HCT 35.2 (L) 07/17/2018    MCV 90 07/17/2018     07/17/2018     Results for orders placed or performed during the hospital encounter of 07/17/18   Basic Metabolic Panel   Result Value Ref Range    Sodium 136 136 - 145 mmol/L    Potassium 4.2 3.5 - 5.0 mmol/L    Chloride 98 98 - 107 mmol/L    CO2 29 22 - 31 mmol/L    Anion Gap, Calculation 9 5 - 18 mmol/L    Glucose 155 (H) 70 - 125 mg/dL    Calcium 9.3 8.5 - 10.5 mg/dL    BUN 28 8 - 28 mg/dL    Creatinine 1.55 (H) 0.70 - 1.30 mg/dL    GFR MDRD Af Amer 52 (L) >60 mL/min/1.73m2    GFR MDRD Non Af Amer 43 (L) >60 mL/min/1.73m2     Lab Results   Component Value Date    HGBA1C 7.3 (H) 07/18/2018     Vitamin D, Total (25-Hydroxy)   Date Value Ref Range Status   04/03/2017 6.6 (L) 30.0 - 80.0 ng/mL Final     Lab Results   Component Value Date    AKROYGVZ28 274 04/03/2017     ASSESSMENT/PLAN:    1. Acute on chronic diastolic congestive heart failure (H) - Continue Lasix and Spironolactone.    2. Persistent atrial fibrillation (H) - Continue Diltiazem and Digoxin.  Anticoagulated on Coumadin   3. S/P aortic valve replacement with bioprosthetic valve - Stable   4. Essential hypertension with goal blood pressure less than 140/90 - Blood pressures are within target range, will continue Diltiazem, Lasix, and Aldactone   5. KRISHAN (obstructive sleep apnea) - No CPAP   6. Type 2 diabetes mellitus with complication, without long-term current use of insulin (H) - Continue Glipizide and Linagliptin         Electronically signed by:  Dario Santillan CNP    35 minutes TT of which 50% was spent in counseling and coordination of care of the above plan.    Time spent in interview and examination of patient, review of available records,  and discussion with nursing staff. Continue care plan, efforts at therapy, and monitor nutritional status.

## 2021-06-19 NOTE — LETTER
Letter by Dario Santillan CNP at      Author: Dario Santillan CNP Service: -- Author Type: --    Filed:  Encounter Date: 2019 Status: (Other)         Patient: Modesto Martinez   MR Number: [id number]   YOB: 1932   Date of Visit: 2019          [facility]    NAME:  Modesto Martinez             :  1932  MRN: [id number]  CODE STATUS:  DNR    FACILITY:  [facility] [[id number]]       ROOM:   246    CHIEF COMPLAINT/REASON FOR VISIT:  Chief Complaint   Patient presents with   ? Problem Visit     Mobility evaluation     HISTORY OF PRESENT ILLNESS: Modesto Martinez is a 87 y.o. male with HFpEF, AS s/p bioprosthetic valve/afib on coumadin/CKDIII, recently dc'ed  for TIA and a month before that for HFpEF presented from home s/p recurrent falls.  Recurrent falls, multifactorial likely due to physical deconditioning and dehydration from high dose of Lasix.  2 falls in 24hours prior to admission. Per pt and daughter, likely mechanical. Issues with balance and generalized weakness and muslce loss. Tripped and fell. No LOC. Per CT head and CT cervical spine negative.  EKG- afib rate controlled/TNI negative  -No electrolyte abnormalities to explain rapid onset of weakness  -No myalgia or muscle stiffness to suspect polymyalgia rheumatica, dermatomyositis  -daughter would like the pt to be continued on coumadin and aware of the bleeding risks  -Unable to obtain orthostatic vitals, as patient unable to stand up long enough to check BP due to weakness  -PT/OT recommending TCU  - tele monitoring-no cardiac arrhythmias  -Suspect overdiuresis and contributing, his current weight 195 pounds, patient daughter states he has never been less than 200 pounds, and weight found down to 195 pounds during this hospitalization.  BUN 46, indicating dehydration.  Patient very high dose 80 mg twice a day of Lasix.   -Decrease Lasix 60 mg twice a day,  check orthostatic vitals, renal numbers improved.  -Further decrease Lasix dose to 40 mg twice a day and discharge.  Continue with daily weights at TCU.     HFpEF: stable. Appears euvolemic. B/l LE edema seems chronic.   - c/w spironolactone and Lasix at lower dose as above     IDDMII:resume glipizide.      Afib: c/w digoxin and diltiazem.  Digoxin level pending on the day of discharge.     GERD: PPI     TIA: coumadin/statin     Dyslipidemia: statin     Diabetic/cardiac diet  Patient was stabilized and transferred to TCU for continued rehabilitation.    Mobility Evaluation  The patient has a mobility limitation that significantly impairs his ability to participate in one or more mobility related activities of daily living (MRADL's) such as toileting, feeding, dressing, grooming, and bathing in customary locations in the home. Due to patient's Repeated falls, difficulty walking, muscle weakness, unsteadiness on feet, and CHF he is unable to complete MRADL's within a reasonable time frame or may prevent patient from accomplishing a MRADL entirely and therefore requires a manual wheelchair. Due to patient's Repeated falls, difficulty walking, muscle weakness, unsteadiness on feet, and CHF the patient's mobility limitation cannot be sufficiently resolved by the use of an appropriately fitted cane or walker. The patient's home provides adequate access between room, maneuvering space, and surfaces for use of the manual wheelchair that is provided. The use of a manual wheelchair will significantly improve the patient's ability to participate in MRADL's and the patient will use it on a regular basis in the home. The patient has not expressed an unwillingness to use the manualwheelchair that is provide in the home. The patient has sufficient upper extremity function and other physical and mental capabilities needed to safely self-propel the manual wheelchair that is provided in the home during a typical day. The patient has  a caregiver who is available, willing, and able to provide assistance with the wheelchair.    Past Medical History:   Diagnosis Date   ? Aortic stenosis    ? Arthritis     knees   ? Atrial fibrillation (H)    ? CAD (coronary artery disease)    ? CHF (congestive heart failure) (H)    ? Diabetes mellitus, type II (H)    ? HTN (hypertension)    ? Hyperlipidemia     controlled on medications   ? KRISHAN (obstructive sleep apnea)     no CPAP     Past Surgical History:   Procedure Laterality Date   ? BACK SURGERY  1970    L5   ? CARDIAC VALVE SURGERY      cow valve   ? EYE SURGERY Bilateral     cataracts     Family History   Problem Relation Age of Onset   ? Cancer Mother    ? Diabetes Father      Social History     Socioeconomic History   ? Marital status:      Spouse name: Not on file   ? Number of children: Not on file   ? Years of education: Not on file   ? Highest education level: Not on file   Occupational History   ? Not on file   Social Needs   ? Financial resource strain: Not on file   ? Food insecurity:     Worry: Not on file     Inability: Not on file   ? Transportation needs:     Medical: Not on file     Non-medical: Not on file   Tobacco Use   ? Smoking status: Never Smoker   ? Smokeless tobacco: Never Used   Substance and Sexual Activity   ? Alcohol use: No   ? Drug use: No   ? Sexual activity: Not on file   Lifestyle   ? Physical activity:     Days per week: Not on file     Minutes per session: Not on file   ? Stress: Not on file   Relationships   ? Social connections:     Talks on phone: Not on file     Gets together: Not on file     Attends Synagogue service: Not on file     Active member of club or organization: Not on file     Attends meetings of clubs or organizations: Not on file     Relationship status: Not on file   ? Intimate partner violence:     Fear of current or ex partner: Not on file     Emotionally abused: Not on file     Physically abused: Not on file     Forced sexual activity: Not on  file   Other Topics Concern   ? Not on file   Social History Narrative   ? Not on file     Allergies   Allergen Reactions   ? Levaquin [Levofloxacin]      Tendonitis     Current Outpatient Medications   Medication Sig Dispense Refill   ? acetaminophen (TYLENOL) 325 MG tablet Take 2 tablets (650 mg total) by mouth every 4 (four) hours as needed for pain or fever. 20 tablet 0   ? atorvastatin (LIPITOR) 40 MG tablet Take 1 tablet (40 mg total) by mouth at bedtime. lipids 30 tablet 0   ? cholecalciferol, vitamin D3, 1,000 unit tablet Take 1 tablet (1,000 Units total) by mouth at bedtime. General health (Patient taking differently: Take 1,000 Units by mouth daily. General health      ) 30 tablet 0   ? cyanocobalamin 1000 MCG tablet Take 1 tablet (1,000 mcg total) by mouth at bedtime. b12 def (Patient taking differently: Take 1,000 mcg by mouth daily. b12 def      ) 30 tablet 0   ? digoxin (LANOXIN) 125 mcg tablet Take 1 tablet (125 mcg total) by mouth daily with supper. afib 30 tablet 0   ? diltiazem (CARDIZEM CD) 120 MG 24 hr capsule Take 1 capsule (120 mg total) by mouth every morning. afib 30 capsule 0   ? ferrous sulfate 325 (65 FE) MG tablet Take 1 tablet by mouth 2 (two) times a day.            ? furosemide (LASIX) 40 MG tablet Take 1 tablet (40 mg total) by mouth 2 (two) times a day at 9am and 6pm. 60 tablet 0   ? glipiZIDE (GLUCOTROL) 5 MG tablet Take 5 mg by mouth daily with breakfast.     ? lansoprazole (PREVACID) 30 MG capsule Take 1 capsule (30 mg total) by mouth every morning. gerd 30 capsule 0   ? nitroglycerin (NITROSTAT) 0.4 MG SL tablet Place 1 tablet (0.4 mg total) under the tongue every 5 (five) minutes as needed for chest pain. 30 tablet 0   ? senna-docusate (PERICOLACE) 8.6-50 mg tablet Take 1 tablet by mouth 2 (two) times a day as needed for constipation.            ? spironolactone (ALDACTONE) 25 MG tablet Take 0.5 tablets (12.5 mg total) by mouth every morning. chf 30 tablet 0   ? warfarin  (COUMADIN/JANTOVEN) 3 MG tablet Take 3-4.5 mg by mouth See Admin Instructions. 8/19/19 INR 2.86 Cont 3mg M-W-F, 4.5mg AOD. Next INR 8/26.  8/6/19 INR 2.28 3mg M, W, F and 4.5mg AOD. Next INR 8/8  4.5 mg on T, Th, Sa, Sun  3 mg on MWF             No current facility-administered medications for this visit.        REVIEW OF SYSTEMS:    Currently, no fever, chills, or rigors. Does not have any visual or hearing problems. Denies any chest pain, headaches, palpitations, lightheadedness, dizziness, shortness of breath, or cough. Appetite is good. Denies any GERD symptoms. Denies any difficulty with swallowing, nausea, or vomiting.  Denies any abdominal pain, diarrhea or constipation. Denies any urinary symptoms. No insomnia. No active bleeding. No rash.       PHYSICAL EXAMINATION:  Vitals:    09/03/19 2255   BP: 125/59   Pulse: 88   Resp: 20   Temp: 97.9  F (36.6  C)   SpO2: 95%   Weight: 202 lb 1.6 oz (91.7 kg)       GENERAL: Awake, Alert, oriented x3, not in any form of acute distress, answers questions appropriately, follows simple commands, conversant  HEENT: Head is normocephalic with normal hair distribution. No evidence of trauma. Ears: No acute purulent discharge. Eyes: Conjunctivae pink with no scleral jaundice. Nose: Normal mucosa and septum. NECK: Supple with no cervical or supraclavicular lymphadenopathy. Trachea is midline.   CHEST: No tenderness or deformity, no crepitus  LUNG: Clear to auscultation with good chest expansion. There are no crackles or wheezes, normal AP diameter.  BACK: No kyphosis of the thoracic spine. Symmetric, no curvature, ROM normal, no CVA tenderness, no spinal tenderness   CVS: There is good S1  S2, there are no murmurs, rubs, gallops, or heaves, rhythm is regular,  2+ pulses symmetric in all extremities.  ABDOMEN: Globular and soft, nontender to palpation, non distended, no masses, no organomegaly, good bowel sounds, no rebound or guarding, no peritoneal signs.   EXTREMITIES:  Full  range of motion on both upper and lower extremities, there is no tenderness to palpation, no pedal edema, no cyanosis or clubbing, no calf tenderness.  Pulses equal in all extremities, normal cap refill, no joint swelling.  SKIN: Warm and dry, no erythema noted.  Skin color, texture, no rashes or lesions.  NEUROLOGICAL: The patient is oriented to person, place and time. Strength and sensation are grossly intact. Face is symmetric.    LABS:      Lab Results   Component Value Date    WBC 6.2 08/12/2019    HGB 9.4 (L) 08/12/2019    HCT 31.1 (L) 08/12/2019    MCV 91 08/12/2019     08/12/2019     Results for orders placed or performed in visit on 08/12/19   Basic Metabolic Panel   Result Value Ref Range    Sodium 141 136 - 145 mmol/L    Potassium 4.1 3.5 - 5.0 mmol/L    Chloride 103 98 - 107 mmol/L    CO2 31 22 - 31 mmol/L    Anion Gap, Calculation 7 5 - 18 mmol/L    Glucose 111 70 - 125 mg/dL    Calcium 9.3 8.5 - 10.5 mg/dL    BUN 30 (H) 8 - 28 mg/dL    Creatinine 1.09 0.70 - 1.30 mg/dL    GFR MDRD Af Amer >60 >60 mL/min/1.73m2    GFR MDRD Non Af Amer >60 >60 mL/min/1.73m2     Lab Results   Component Value Date    HGBA1C 6.9 (H) 06/22/2019     Vitamin D, Total (25-Hydroxy)   Date Value Ref Range Status   04/03/2017 6.6 (L) 30.0 - 80.0 ng/mL Final     Lab Results   Component Value Date    [id number] >2,000 (H) 08/02/2019       ASSESSMENT/PLAN:    1. Impaired mobility - Upon discharge, patient will need a wheelchair with cushion and bilateral arm and foot rests   2. Frequent falls - See aobve   3. Difficulty walking - See above   4. Persistent atrial fibrillation (H) - INR 2.61, will give Coumadin 5 mg daily and will check INR on 8/29/2019.  Patient is also on Cardizem   5. Essential hypertension with goal blood pressure less than 140/90 - Blood pressures are within target range,w ill continue Lasix, Spironolactone, and Cardizem               Electronically signed by:  Dario Santillan, TY    Total time  spent on the unit was 40 minutes of which 25 minutes was spent in counseling on wheelchair needs upon discharge, Atrial fibrillation, and INR/Coumadin dosing to prevent falls and Coumadin and coordination of care of the above plan with nursing staff, patient, and therapy.

## 2021-06-19 NOTE — PROGRESS NOTES
[facility]      Facility:    [facility] [[id number]]  Code Status: FULL CODE      Chief Complaint/Reason for Visit:  Chief Complaint   Patient presents with     H & P       HPI:   Modesto is a 86 y.o. male who was recently hospitalized for exacerbation of congestive heart failure.  He does have history of bilateral prosthetic aortic valve replacement with the Coumadin target of 2-3 for the INR.  His ejection fraction is 60% as of 2016.  Does have history of hypertension, type 2 diabetes mellitus, coronary artery disease, obstructive sleep apnea, osteoarthritis of the knees, and hyperlipidemia.  He is a retired  for the [employer] and he used to travel from the [facility] to [place] on a regular basis.    Upon current review of systems, he states that his shortness of breath and weakness in his legs is about the same as when he entered the hospital.  It has not worsened.  He is not having fevers or chills.  He does not have sore throat or nasal congestion.  He does not have chest pain or palpitations.  He does not have abdominal pain or nausea.  He is not having urinary symptoms.  He is not having bowel problems.  Nursing notes that there is superficial abrasion/laceration of wrist area and forearm area for which wet-to-dry dressings would be of help until healed.    Past Medical History:  Past Medical History:   Diagnosis Date     Aortic stenosis      Arthritis     knees     Atrial fibrillation (H)      CAD (coronary artery disease)      CHF (congestive heart failure) (H)      Diabetes mellitus, type II (H)      HTN (hypertension)      Hyperlipidemia     controlled on medications     KRISHAN (obstructive sleep apnea)     no CPAP           Surgical History:  Past Surgical History:   Procedure Laterality Date     BACK SURGERY  1970    L5     CARDIAC VALVE SURGERY      cow valve     EYE SURGERY Bilateral     cataracts       Family History:   Family History   Problem Relation Age  of Onset     Cancer Mother      Diabetes Father        Social History:    Social History     Social History     Marital status:      Spouse name: N/A     Number of children: N/A     Years of education: N/A     Social History Main Topics     Smoking status: Never Smoker     Smokeless tobacco: Never Used     Alcohol use No     Drug use: No     Sexual activity: Not on file     Other Topics Concern     Not on file     Social History Narrative          Review of Systems   All other systems reviewed and are negative.      Vitals:    07/23/18 0618   BP: 132/54   Pulse: 60   Resp: 17   Temp: 97.1  F (36.2  C)   SpO2: 97%       Physical Exam   Constitutional: No distress.   HENT:   Mouth/Throat: Oropharynx is clear and moist.   Eyes: Right eye exhibits no discharge. Left eye exhibits no discharge.   Neck: No JVD present. No thyromegaly present.   Cardiovascular: Normal heart sounds.    Pulmonary/Chest: Breath sounds normal. No respiratory distress.   Abdominal: Soft. Bowel sounds are normal. He exhibits no distension. There is no tenderness.   Musculoskeletal: He exhibits no edema.   He is wearing compression stockings   Lymphadenopathy:     He has no cervical adenopathy.   Neurological: He is alert.   Skin:   He has bandaging over his forearms.   Psychiatric: He has a normal mood and affect.   Nursing note and vitals reviewed.      Medication List:  Current Outpatient Prescriptions   Medication Sig     atorvastatin (LIPITOR) 40 MG tablet Take 40 mg by mouth bedtime.     cholecalciferol, vitamin D3, 1,000 unit tablet Take 1,000 Units by mouth daily.     cyanocobalamin 1000 MCG tablet Take 1,000 mcg by mouth daily.     digoxin (LANOXIN) 125 mcg tablet Take 1 tablet (125 mcg total) by mouth daily.     diltiazem (CARDIZEM CD) 120 MG 24 hr capsule Take 1 capsule (120 mg total) by mouth every morning.     furosemide (LASIX) 40 MG tablet Take 1 tablet (40 mg total) by mouth daily.     glipiZIDE (GLUCOTROL) 10 MG 24 hr  tablet Take 10 mg by mouth every morning.      lansoprazole (PREVACID) 30 MG capsule Take 30 mg by mouth every morning.      linagliptin 5 mg Tab Take 5 mg by mouth daily.     spironolactone (ALDACTONE) 25 MG tablet Take 12.5 mg by mouth every morning. Takes 1/2 of 25mg tablet for 12.5mg dose     warfarin (COUMADIN) 5 MG tablet Take 0.5 tablets (2.5 mg total) by mouth See Admin Instructions for 2 days. 2.5 mg for 2 days then adjust based on INR check in 2 days.     warfarin sodium (WARFARIN DAILY DOSE) Take by mouth daily before supper. 7/22/18 INR 2.0 had 2.5mg. No further INR/coumadin orders. Give 2.5mg today. Check INR in am 7/23.  7/20/18 INR 2.85 2.5mg daily.       Labs:  INR from yesterday is 2.0    Assessment:    ICD-10-CM    1. Acute on chronic diastolic congestive heart failure (H) I50.33    2. S/P aortic valve replacement with bioprosthetic valve Z95.3    3. Atherosclerosis of native coronary artery of native heart without angina pectoris I25.10    4. Essential hypertension with goal blood pressure less than 140/90 I10        Plan:  He is continuing on his Coumadin of 2.5 mg daily and there has been an INR set to be checked tomorrow, 7/24.  Continue with therapies.  Continue to monitor his medical conditions.  Wet-to-dry dressings daily to the forearm until healed.      Electronically signed by: Carlos Delgado MD

## 2021-06-19 NOTE — LETTER
Letter by Tere Stallworth MD at      Author: Tere Stallworth MD Service: -- Author Type: --    Filed:  Encounter Date: 7/3/2019 Status: (Other)         Patient: Modesto Martinez   MR Number: [id number]   YOB: 1932   Date of Visit: 7/3/2019                               [facility]        Visit Type: H & P (Acute CHF)    Code Status:  DNR  Facility:  [facility] [[id number]]          PCP: Rik Lobato MD       PHONE: [phone]     FAX:[phone]        ASSESSMENT/PLAN:  1. Acute on chronic diastolic congestive heart failure (H)   now stable, will continue Lasix 80 mg twice daily and spinal lactone 12.5 mg daily.  Continue monitoring weights, low-sodium diet.  Recheck BMP on 7/8.  Follow-up with heart failure clinic early next week.   2. Type 2 diabetes mellitus with complication, without long-term current use of insulin (H)   blood sugars with range 154-308 and so glipizide restarted at 5 mg daily (at home, the patient was on glipizide XL 10 mg and linagliptin tied 5 mg daily)    3. Persistent atrial fibrillation (H)   continue diltiazem, digoxin for rate control.  Await next INR on 7/8   4. Other iron deficiency anemia   hemoglobin low but stable, will monitor.  No evidence for bleeding at this time   5. Chronic renal disease, stage III (H)   GFR is 48, will monitor since on high-dose Lasix   6. S/P aortic valve replacement with bioprosthetic valve   stable   7. Excoriation of multiple sites   symptomatic treatment, patient refuses further intervention or evaluation         HISTORY OF PRESENT ILLNESS:   Modesto Martinez is a 87 y.o. male with a history of type 2 diabetes, atrial fibrillation on Coumadin, history of AVR with bioprosthetic valve, KRISHAN on CPAP, CHF who was admitted from the heart failure clinic because of failure of outpatient management.  The patient was said to have acute on chronic diastolic and Starlix CHF and was  initially placed on IV Lasix, spironolactone and transitioned to p.o. Lasix 80 mg twice daily and spironolactone at 12.5 mg in a.m.  He also was noted to have low blood sugars and his glipizide and linagliptide were held.  His atrial fibrillation was controlled on digoxin and and he is on Coumadin for anticoagulation.  He was also anemic in the hospital with a hemoglobin of 9.0 but the patient and his daughter did not agree on further evaluation with colonoscopy.  He also has multiple scabs on his face and arms but again he refused any further evaluation for this.    Currently, the patient states that he is doing much better and he denies any increasing shortness of breath.  He states that that he usually gets short of breath with long walks which has not changed.  He denies any chest pains or palpitations.  He does not have any dizziness or lightheadedness.  His appetite is good and he has been sleeping better without any problems.  He states that he does not have any further complaints.  He states that he is aware that he has multiple scabs but he states that this has been ongoing for a while and that he has lived with it without any problems.  He denies any weakness.  Of note is that his hemoglobin in the hospital is at 9.0 with a repeat at 9.3 on 7/1/2019.  His current INR is at 2.21.  At home he takes 3 mg of Coumadin on Tuesdays, Thursdays, Saturdays, Sundays and 4.5 mg MWF.  He is also on diltiazem and digoxin.  His GFR has been stable at 48 with a creatinine of 1.39 and BUN of 33.    Other review of systems are negative.          PAST MEDICAL/SURGICAL HISTORY:  Past Medical History:   Diagnosis Date   ? Aortic stenosis    ? Arthritis     knees   ? Atrial fibrillation (H)    ? CAD (coronary artery disease)    ? CHF (congestive heart failure) (H)    ? Diabetes mellitus, type II (H)    ? HTN (hypertension)    ? Hyperlipidemia     controlled on medications   ? KRISHAN (obstructive sleep apnea)     no CPAP     Past  Surgical History:   Procedure Laterality Date   ? BACK SURGERY  1970    L5   ? CARDIAC VALVE SURGERY      cow valve   ? EYE SURGERY Bilateral     cataracts       SOCIAL HISTORY:  Social History     Socioeconomic History   ? Marital status:      Spouse name: Not on file   ? Number of children: Not on file   ? Years of education: Not on file   ? Highest education level: Not on file   Occupational History   ? Not on file   Social Needs   ? Financial resource strain: Not on file   ? Food insecurity:     Worry: Not on file     Inability: Not on file   ? Transportation needs:     Medical: Not on file     Non-medical: Not on file   Tobacco Use   ? Smoking status: Never Smoker   ? Smokeless tobacco: Never Used   Substance and Sexual Activity   ? Alcohol use: No   ? Drug use: No   ? Sexual activity: Not on file   Lifestyle   ? Physical activity:     Days per week: Not on file     Minutes per session: Not on file   ? Stress: Not on file   Relationships   ? Social connections:     Talks on phone: Not on file     Gets together: Not on file     Attends Mandaen service: Not on file     Active member of club or organization: Not on file     Attends meetings of clubs or organizations: Not on file     Relationship status: Not on file   ? Intimate partner violence:     Fear of current or ex partner: Not on file     Emotionally abused: Not on file     Physically abused: Not on file     Forced sexual activity: Not on file   Other Topics Concern   ? Not on file   Social History Narrative   ? Not on file       FAMILY HISTORY:  Family History   Problem Relation Age of Onset   ? Cancer Mother    ? Diabetes Father        MEDICATIONS:  Current Outpatient Medications on File Prior to Visit   Medication Sig   ? acetaminophen (TYLENOL) 325 MG tablet Take 2 tablets (650 mg total) by mouth every 4 (four) hours as needed for pain or fever.   ? atorvastatin (LIPITOR) 40 MG tablet Take 1 tablet (40 mg total) by mouth at bedtime. lipids   ?  cholecalciferol, vitamin D3, 1,000 unit tablet Take 1 tablet (1,000 Units total) by mouth at bedtime. General health   ? cyanocobalamin 1000 MCG tablet Take 1 tablet (1,000 mcg total) by mouth at bedtime. b12 def   ? digoxin (LANOXIN) 125 mcg tablet Take 1 tablet (125 mcg total) by mouth daily with supper. afib   ? diltiazem (CARDIZEM CD) 120 MG 24 hr capsule Take 1 capsule (120 mg total) by mouth every morning. afib   ? furosemide (LASIX) 80 MG tablet Take 1 tablet (80 mg total) by mouth 2 (two) times a day at 9am and 6pm. For chf   ? lansoprazole (PREVACID) 30 MG capsule Take 1 capsule (30 mg total) by mouth every morning. gerd   ? NOVOLOG FLEXPEN U-100 INSULIN 100 unit/mL (3 mL) injection pen Check blood sugar three (3) times daily.  11.9 Type 2 without complications  BD Ultra-fine Shalonda Pen Needles - NDC [address]-54 - dispense 1 case, refill PRN for 1 year  Microlet Color Lancets (100s) - dispense 1, refill PRN for 1 year   ? spironolactone (ALDACTONE) 25 MG tablet Take 0.5 tablets (12.5 mg total) by mouth every morning. chf   ? warfarin (COUMADIN/JANTOVEN) 3 MG tablet Take 1 tablet (3 mg total) by mouth See Admin Instructions. Take 1 tablet (3 mg) on T, R, Sat, Sun  Take 1.5 tablets (4.5 mg) on MWF, Needs Daily inr and goal of inr is 2-3, call MD daily for confirmed dosing (Patient taking differently: Take 3 mg by mouth See Admin Instructions. 7/1/19 INR 2.21 Cont 4.5mg M-W-F, 3mg AOD. Next INR 7/8.  6/28/19 INR 2.16 Take 4.5mg M, W, F and 3mg AOD Next INR 7/1  Take 1 tablet (3 mg) on T, R, Sat, Sun  Take 1.5 tablets (4.5 mg) on MWF, Needs Daily inr and goal of inr is 2-3, call MD daily for confirmed dosing      )     No current facility-administered medications on file prior to visit.        ALLERGIES:  Allergies   Allergen Reactions   ? Levaquin [Levofloxacin]      Tendonitis         PHYSICAL EXAMINATION:  Vital signs 131/52, 97.7, 83, 27.6 pounds, 14, 93% room air  General: Awake, Alert, oriented x3, not in  any form of acute distress, answers questions appropriately, follows simple commands, says he wants to be mostly left alone  HEENT: Pink conjunctiva, anicteric sclerae, oral mucosa is moist  NECK: Supple, without any lymphadenopathy, thyromegaly or any masses  LUNG: Clear to auscultation, good chest expansion. There are no crackles, no wheezes, normal AP diameter  BACK: No kyphosis of the thoracic spine  CVS: There is good S1  S2, there are no murmurs, no heaves, rhythm is regularly irregular  ABDOMEN: Globular and soft, nontender to palpation, no organomegaly, good bowel sounds  EXTREMITIES: Good range of motion on both upper and lower extremities, 2+ bilateral pedal edema, no cyanosis or clubbing, no calf tenderness  SKIN: Warm and dry, audible scabs noted on the face, ears, arms and legs    LABS:  Lab Results   Component Value Date    WBC 7.8 07/03/2019    HGB 9.3 (L) 07/03/2019    HCT 31.7 (L) 07/03/2019    MCV 91 07/03/2019     07/03/2019     Lab Results   Component Value Date    CREATININE 1.39 (H) 06/29/2019    BUN 33 (H) 06/29/2019     06/29/2019    K 3.9 06/29/2019    CL 96 (L) 06/29/2019    CO2 35 (H) 06/29/2019           >45 minutes of total time spent, greater than 55% of the time spent in coordination of care and counseling regarding the above medical issues and plan of care including discussion of generalized excoriation, current labs with decreased GFR and decreased hemoglobin and platelets, anticoagulation with Coumadin and dosage, uncontrolled diabetes. I have reviewed the patient's medical records, labs and medications.       Electronically signed by:Tere Stallworth MD

## 2021-06-19 NOTE — PROGRESS NOTES
[facility]    Facility:   [facility] [[id number]]   Code Status: FULL CODE  PCP: Rik Lobato MD   Phone: [phone]   Fax: [phone]      CHIEF COMPLAINT/REASON FOR VISIT:  Chief Complaint   Patient presents with     Discharge Summary       HISTORY COURSE:  Modesto is a 86 y.o. male who was recently hospitalized for exacerbation of congestive heart failure.  He does have history of bilateral prosthetic aortic valve replacement with the Coumadin target of 2-3 for the INR.  His ejection fraction is 60% as of 2016.  Does have history of hypertension, type 2 diabetes mellitus, coronary artery disease, obstructive sleep apnea, osteoarthritis of the knees, and hyperlipidemia.  He is a retired  for the "[employer] and he used to travel from the [facility] to [place] on a regular basis.    During the time of the transitional care unit he has had improvement of strength and his weight has stabilized.  He had lost 20 pounds in the course of 2-1/2 months.  I explained to him that his weakness was probably due to that significant weight loss.  Also his change of Coumadin dose was due to the loss of weight such that at home his Coumadin dose had been 7.5 mg on Tuesday and 5 mg on all other days.  His admission INR to the hospital was 5.68.  His most recent INRs have been 2.017/22 and 2.1 on 7/24.  His current INR is 1.9, and I advised increasing from the current 2.5 mg daily up to 3 mg daily and recheck the INR within 3 days.  I also advised that he have a BMP checked within the next 3 days as well.    Upon current review of systems, he is not having any fevers or chills nor sore throat nor nasal congestion nor cough and his shortness of breath remains about the same as before.  He does not have chest pain.  Does not have abdominal pain or nausea.    Review of Systems    Vitals:    07/29/18 0513   BP: 124/45   Pulse: 63   Resp: 17   Temp: 97.4  F (36.3  C)   SpO2: 97%        Physical Exam   Constitutional: No distress.   Eyes: Right eye exhibits no discharge. Left eye exhibits no discharge.   Neck: No JVD present.   Cardiovascular: Normal heart sounds.    Pulmonary/Chest: Breath sounds normal. No respiratory distress.   Abdominal: Soft. Bowel sounds are normal. There is no tenderness.   Musculoskeletal: He exhibits no edema.   Neurological: He is alert.   Skin: Skin is warm and dry.   Psychiatric: He has a normal mood and affect.   Nursing note and vitals reviewed.      MEDICATION LIST:  Current Outpatient Prescriptions   Medication Sig     atorvastatin (LIPITOR) 40 MG tablet Take 40 mg by mouth bedtime.     cholecalciferol, vitamin D3, 1,000 unit tablet Take 1,000 Units by mouth daily.     cyanocobalamin 1000 MCG tablet Take 1,000 mcg by mouth daily.     digoxin (LANOXIN) 125 mcg tablet Take 1 tablet (125 mcg total) by mouth daily.     diltiazem (CARDIZEM CD) 120 MG 24 hr capsule Take 1 capsule (120 mg total) by mouth every morning.     furosemide (LASIX) 40 MG tablet Take 1 tablet (40 mg total) by mouth daily.     glipiZIDE (GLUCOTROL) 10 MG 24 hr tablet Take 10 mg by mouth every morning.      lansoprazole (PREVACID) 30 MG capsule Take 30 mg by mouth every morning.      linagliptin 5 mg Tab Take 5 mg by mouth daily.     spironolactone (ALDACTONE) 25 MG tablet Take 12.5 mg by mouth every morning. Takes 1/2 of 25mg tablet for 12.5mg dose     warfarin (COUMADIN) 5 MG tablet Take 0.5 tablets (2.5 mg total) by mouth See Admin Instructions for 2 days. 2.5 mg for 2 days then adjust based on INR check in 2 days.     warfarin sodium (WARFARIN DAILY DOSE) Take by mouth daily before supper. 7/30/18 INR 1.9  Take 3mg daily.  Next INR 8/3/18.  7/24/18 INR 2.1 Take 2.5mg daily Next INR 7/30 7/22/18 INR 2.0 had 2.5mg. No further INR/coumadin orders. Give 2.5mg today. Check INR in am 7/23.  7/20/18 INR 2.85 2.5mg daily.       DISCHARGE DIAGNOSIS:    ICD-10-CM    1. Acute on chronic diastolic  congestive heart failure (H) I50.33    2. Persistent atrial fibrillation (H) I48.1    3. S/P aortic valve replacement with bioprosthetic valve Z95.3    4. Type 2 diabetes mellitus with complication, without long-term current use of insulin (H) E11.8    5. Essential hypertension with goal blood pressure less than 140/90 I10        MEDICAL EQUIPMENT NEEDS:  No new durable medical equipment.    DISCHARGE PLAN/FACE TO FACE:  I certify that services are/were furnished while this patient was under the care of a physician and that a physician or an allowed non-physician practitioner (NPP), had a face-to-face encounter that meets the physician face-to-face encounter requirements. The encounter was in whole, or in part, related to the primary reason for home health. The patient is confined to his/her home and needs intermittent skilled nursing, physical therapy, speech-language pathology, or the continued need for occupational therapy. A plan of care has been established by a physician and is periodically reviewed by a physician.  Date of Face-to-Face Encounter: 7/30/18.    I certify that, based on my findings, the following services are medically necessary home health services: Skilled nursing, home health aide, home physical therapy, and home occupational therapy.    My clinical findings support the need for the above skilled services because: (Please write a brief narrative summary that describes what the RN, PT, SLP, or other services will be doing in the home. A list of diagnoses in this section does not meet the CMS requirements.)  Skilled nursing visits are for medication set up and teaching, symptom and disease process monitoring and education.  Home health aide services are for bathing and ADL needs.  Home physical therapy is to evaluate and treat for strengthening, balance, endurance, and safety with mobility and ambulation.  Home occupational therapy is to evaluate and treat for strengthening, ADL needs, adaptive  equipment and safety.    This patient is homebound because: (Please write a brief narrative summary describing the functional limitations as to why this patient is homebound and specifically what makes this patient homebound.)  Although he has had progress he still is weakened and has risk of fall in terms of transfers and with his mobility.  He also has CANAS secondary to his underlying medical conditions.    The patient is, or has been, under my care and I have initiated the establishment of the plan of care. This patient will be followed by a physician who will periodically review the plan of care.    Schedule follow up visit with primary care provider within 7 days to reestablish care.    Electronically signed by: Carlos Delgado MD

## 2021-06-19 NOTE — LETTER
Letter by Joe Dee MD at      Author: Joe Dee MD Service: -- Author Type: --    Filed:  Encounter Date: 2019 Status: (Other)         Patient: Modesto Martinez   MR Number: [id number]   YOB: 1932   Date of Visit: 2019     [facility]    Facility:   [facility] [[id number]]   Code Status: DNR        Admission History and Physical   Modesto Martinez,  1932, MRN [id number]    PCP: Rik Lobato MD, [phone]    Date of Service   Code status:  [unfilled]       Extended Emergency Contact Information  Primary Emergency Contact: Katelynn Hutchinson   [facility]  Home Phone: [phone]  Mobile Phone: [phone]  Relation: Child  Secondary Emergency Contact: Zachary Martinez   [facility]  Home Phone: [phone]  Relation: Child       Assessment and Plan         1. Recent  Hospitalization  on 19 at [facility] and discharged on 19. He was admitted with recurrent falls which felt likely due to deconditioning and dehydration from high dose of lasix. No other abnormality was found on examination. Pt/ot evaluation was done and recommendations were for TCU.    2. B12 deficiency : on supplement    3. Chronic CHF : on lasix 40 mg two times a day, spironolactone     4. HLD : on statin    5. A fib, h/o : on digoxin, cardizem, coumadin    6. Anemia, chronic : on iron tab    7. DM2 : on glipizide 5 mg daily    8. GERD : on PPI          Disposition/Plan :            Patient is a 87 yr old male. H/o Chronic CHF, s/p aortic valve, a fib. He was recently hospitalized on 19 at [facility] and discharged on 19.  He was admitted with recurrent falls which felt likely due to deconditioning and dehydration from high dose of lasix. No other abnormality was found on examination. Pt/ot evaluation was done and recommendations were for TCU.    Patient was then subsequently admitted at [facility] for rehab, pt and ot.  Patient is medically stable at this time. He denies any significant complaints at this time.           Chief Complaint:  falls     History of Present Illness         Patient is a 87 yr old male. H/o Chronic CHF, s/p aortic valve, a fib. He was recently hospitalized on 08/02/19 at [facility] and discharged on 08/05/19.  He was admitted with recurrent falls which felt likely due to deconditioning and dehydration from high dose of lasix. No other abnormality was found on examination. Pt/ot evaluation was done and recommendations were for TCU.    Patient was then subsequently admitted at [facility] for rehab, pt and ot. Patient is medically stable at this time. He denies any significant complaints at this time.        No h/o fever or chills  No cardiorespiratory symptoms  No abdominal symptoms  No urinary symptoms  No neuro symptoms.       Medical History  Active Ambulatory (Non-Hospital) Problems    Diagnosis   ? ACP (advance care planning)   ? Frequent falls   ? TIA (transient ischemic attack)   ? Dizziness   ? Acute combined systolic and diastolic CHF, NYHA class 1 (H)   ? Acute exacerbation of CHF (congestive heart failure) (H)   ? CHF exacerbation (H)   ? Melena   ? Syncope   ? Acute blood loss anemia   ? Chronic diastolic congestive heart failure (H)   ? Persistent atrial fibrillation (H)   ? Anticoagulated on Coumadin   ? S/P aortic valve replacement with bioprosthetic valve   ? Obstructive Sleep Apnea   ? Hypertension   ? Coronary Artery Disease   ? Aortic Stenosis   ? Atrial flutter (H)   ? Acute Combined Systolic And Diastolic Congestive Heart Failure   ? Type 2 diabetes mellitus with complication, without long-term current use of insulin (H)   ? Obesity     Past Medical History:   Diagnosis Date   ? Aortic stenosis    ? Arthritis    ? Atrial fibrillation (H)    ? CAD (coronary artery disease)    ? CHF (congestive heart failure) (H)    ? Diabetes mellitus, type II (H)    ? HTN (hypertension)    ?  Hyperlipidemia    ? KRISHAN (obstructive sleep apnea)         Surgical History  He  has a past surgical history that includes Back surgery (1970); Eye surgery (Bilateral); and Cardiac valuve replacement.       Social History  Reviewed, and he  reports that he has never smoked. He has never used smokeless tobacco. He reports that he does not drink alcohol or use drugs.     Allergies  Allergies   Allergen Reactions   ? Levaquin [Levofloxacin]      Tendonitis    Family History  Reviewed, and family history includes Cancer in his mother; Diabetes in his father.          Prior to Admission Medications     (Not in a hospital admission)       Review of Systems:  A 12 point comprehensive review of systems was negative except as noted. Physical Exam:      HEENT : no distended veins, no lymphadenopathy, thyroid is normal  LUNGS : b/l air entry present, no significant crackles/wheezing.  ABDOMEN : soft, non-tender, non-distended, BS present.  HEART :  Regular rate & rhythm, S1 & S2 normal, no murmur, clicks/rubs, no ankle edema,  NEURO : conscious, oriented, responds to commands, no obvious focal deficit.  MUSCULOSKELETAL : EXTREMITIES/BACK :  no calf tenderness.  SKIN : no rashes      Vitals:    08/26/19 1706   BP: 134/55   Pulse: 88   Resp: 22   Temp: 98.2  F (36.8  C)   SpO2: 92%                    Pertinent Labs      Lab Results: personally reviewed.   Lab Results   Component Value Date     08/12/2019    K 4.1 08/12/2019     08/12/2019    CO2 31 08/12/2019    BUN 30 (H) 08/12/2019    CREATININE 1.09 08/12/2019    CALCIUM 9.3 08/12/2019       Pertinent Radiology      Radiology Results: Personally reviewed image/s  EKG Results:    Advanced Care Planning             Total Time Spent > 45 mins.  Discharge Planning discussed with Patient and Family  Discussed care with Patient for 35  Minutes and greater than 50% of total time spent in coordinating the plan of care.             Electronically signed by: Joe Dee  MD

## 2021-06-19 NOTE — LETTER
Letter by Andrew Callejas DO at      Author: Andrew Callejas DO Service: -- Author Type: --    Filed:  Encounter Date: 4/16/2019 Status: (Other)         Modesto Martinez  [address] [facility] [address]      April 16, 2019      Dear Modesto,    This letter is to remind you that you will be due for your follow up appointment with Dr. Andrew Callejas  . To help ensure you are in the best health possible, a regular follow-up with your cardiologist is essential.     Please call our Patient Scheduling Line at [phone] to schedule your appointment at your earliest convenience.  If you have recently scheduled an appointment, please disregard this letter.    We look forward to seeing you again. As always, we are available at the number  above for any questions or concerns you may have.      Sincerely,     The Physicians and Staff of [facility]

## 2021-06-19 NOTE — LETTER
Letter by Dario Santillan CNP at      Author: Dario Santillan CNP Service: -- Author Type: --    Filed:  Encounter Date: 2019 Status: (Other)         Patient: Modesto Martinez   MR Number: [id number]   YOB: 1932   Date of Visit: 2019          [facility]    NAME:  Modesto Martinez             :  1932  MRN: [id number]  CODE STATUS:  DNR    FACILITY:  [facility] [[id number]]       ROOM:   246    CHIEF COMPLAINT/REASON FOR VISIT:  Chief Complaint   Patient presents with   ? Problem Visit     Dehydration and recurrent falls      HISTORY OF PRESENT ILLNESS: Modesto Martinez is a 87 y.o. male with HFpEF, AS s/p bioprosthetic valve/afib on coumadin/CKDIII, recently dc'ed  for TIA and a month before that for HFpEF presented from home s/p recurrent falls.  Recurrent falls, multifactorial likely due to physical deconditioning and dehydration from high dose of Lasix.  2 falls in 24hours prior to admission. Per pt and daughter, likely mechanical. Issues with balance and generalized weakness and muslce loss. Tripped and fell. No LOC. Per CT head and CT cervical spine negative.  EKG- afib rate controlled/TNI negative  -No electrolyte abnormalities to explain rapid onset of weakness  -No myalgia or muscle stiffness to suspect polymyalgia rheumatica, dermatomyositis  -daughter would like the pt to be continued on coumadin and aware of the bleeding risks  -Unable to obtain orthostatic vitals, as patient unable to stand up long enough to check BP due to weakness  -PT/OT recommending TCU  - tele monitoring-no cardiac arrhythmias  -Suspect overdiuresis and contributing, his current weight 195 pounds, patient daughter states he has never been less than 200 pounds, and weight found down to 195 pounds during this hospitalization.  BUN 46, indicating dehydration.  Patient very high dose 80 mg twice a day of Lasix.   -Decrease Lasix 60 mg  twice a day, check orthostatic vitals, renal numbers improved.  -Further decrease Lasix dose to 40 mg twice a day and discharge.  Continue with daily weights at TCU.     HFpEF: stable. Appears euvolemic. B/l LE edema seems chronic.   - c/w spironolactone and Lasix at lower dose as above     IDDMII:resume glipizide.      Afib: c/w digoxin and diltiazem.  Digoxin level pending on the day of discharge.     GERD: PPI     TIA: coumadin/statin     Dyslipidemia: statin     Diabetic/cardiac diet  Patient was stabilized and transferred to TCU for continued rehabilitation.    Past Medical History:   Diagnosis Date   ? Aortic stenosis    ? Arthritis     knees   ? Atrial fibrillation (H)    ? CAD (coronary artery disease)    ? CHF (congestive heart failure) (H)    ? Diabetes mellitus, type II (H)    ? HTN (hypertension)    ? Hyperlipidemia     controlled on medications   ? KRISHAN (obstructive sleep apnea)     no CPAP     Past Surgical History:   Procedure Laterality Date   ? BACK SURGERY  1970    L5   ? CARDIAC VALVE SURGERY      cow valve   ? EYE SURGERY Bilateral     cataracts     Family History   Problem Relation Age of Onset   ? Cancer Mother    ? Diabetes Father      Social History     Socioeconomic History   ? Marital status:      Spouse name: Not on file   ? Number of children: Not on file   ? Years of education: Not on file   ? Highest education level: Not on file   Occupational History   ? Not on file   Social Needs   ? Financial resource strain: Not on file   ? Food insecurity:     Worry: Not on file     Inability: Not on file   ? Transportation needs:     Medical: Not on file     Non-medical: Not on file   Tobacco Use   ? Smoking status: Never Smoker   ? Smokeless tobacco: Never Used   Substance and Sexual Activity   ? Alcohol use: No   ? Drug use: No   ? Sexual activity: Not on file   Lifestyle   ? Physical activity:     Days per week: Not on file     Minutes per session: Not on file   ? Stress: Not on file    Relationships   ? Social connections:     Talks on phone: Not on file     Gets together: Not on file     Attends Sikh service: Not on file     Active member of club or organization: Not on file     Attends meetings of clubs or organizations: Not on file     Relationship status: Not on file   ? Intimate partner violence:     Fear of current or ex partner: Not on file     Emotionally abused: Not on file     Physically abused: Not on file     Forced sexual activity: Not on file   Other Topics Concern   ? Not on file   Social History Narrative   ? Not on file     Allergies   Allergen Reactions   ? Levaquin [Levofloxacin]      Tendonitis     Current Outpatient Medications   Medication Sig Dispense Refill   ? acetaminophen (TYLENOL) 325 MG tablet Take 2 tablets (650 mg total) by mouth every 4 (four) hours as needed for pain or fever. 20 tablet 0   ? atorvastatin (LIPITOR) 40 MG tablet Take 1 tablet (40 mg total) by mouth at bedtime. lipids 30 tablet 0   ? cholecalciferol, vitamin D3, 1,000 unit tablet Take 1 tablet (1,000 Units total) by mouth at bedtime. General health (Patient taking differently: Take 1,000 Units by mouth daily. General health      ) 30 tablet 0   ? cyanocobalamin 1000 MCG tablet Take 1 tablet (1,000 mcg total) by mouth at bedtime. b12 def (Patient taking differently: Take 1,000 mcg by mouth daily. b12 def      ) 30 tablet 0   ? digoxin (LANOXIN) 125 mcg tablet Take 1 tablet (125 mcg total) by mouth daily with supper. afib 30 tablet 0   ? diltiazem (CARDIZEM CD) 120 MG 24 hr capsule Take 1 capsule (120 mg total) by mouth every morning. afib 30 capsule 0   ? ferrous sulfate 325 (65 FE) MG tablet Take 1 tablet by mouth 2 (two) times a day.            ? furosemide (LASIX) 40 MG tablet Take 1 tablet (40 mg total) by mouth 2 (two) times a day at 9am and 6pm. 60 tablet 0   ? glipiZIDE (GLUCOTROL) 5 MG tablet Take 5 mg by mouth daily with breakfast.     ? lansoprazole (PREVACID) 30 MG capsule Take 1  capsule (30 mg total) by mouth every morning. gerd 30 capsule 0   ? nitroglycerin (NITROSTAT) 0.4 MG SL tablet Place 1 tablet (0.4 mg total) under the tongue every 5 (five) minutes as needed for chest pain. 30 tablet 0   ? senna-docusate (PERICOLACE) 8.6-50 mg tablet Take 1 tablet by mouth 2 (two) times a day as needed for constipation.            ? spironolactone (ALDACTONE) 25 MG tablet Take 0.5 tablets (12.5 mg total) by mouth every morning. chf 30 tablet 0   ? warfarin (COUMADIN/JANTOVEN) 3 MG tablet Take 3-4.5 mg by mouth See Admin Instructions. 8/19/19 INR 2.86 Cont 3mg M-W-F, 4.5mg AOD. Next INR 8/26.  8/6/19 INR 2.28 3mg M, W, F and 4.5mg AOD. Next INR 8/8  4.5 mg on T, Th, Sa, Sun  3 mg on MWF             No current facility-administered medications for this visit.        REVIEW OF SYSTEMS:    Currently, no fever, chills, or rigors. Does not have any visual or hearing problems. Denies any chest pain, headaches, palpitations, lightheadedness, dizziness, shortness of breath, or cough. Appetite is good. Denies any GERD symptoms. Denies any difficulty with swallowing, nausea, or vomiting.  Denies any abdominal pain, diarrhea or constipation. Denies any urinary symptoms. No insomnia. No active bleeding. No rash.       PHYSICAL EXAMINATION:  Vitals:    08/19/19 2330   BP: 139/57   Pulse: 85   Resp: 22   Temp: 98  F (36.7  C)   SpO2: 90%   Weight: 198 lb 12.8 oz (90.2 kg)       GENERAL: Awake, Alert, oriented x3, not in any form of acute distress, answers questions appropriately, follows simple commands, conversant  HEENT: Head is normocephalic with normal hair distribution. No evidence of trauma. Ears: No acute purulent discharge. Eyes: Conjunctivae pink with no scleral jaundice. Nose: Normal mucosa and septum. NECK: Supple with no cervical or supraclavicular lymphadenopathy. Trachea is midline.   CHEST: No tenderness or deformity, no crepitus  LUNG: Clear to auscultation with good chest expansion. There are no  crackles or wheezes, normal AP diameter.  BACK: No kyphosis of the thoracic spine. Symmetric, no curvature, ROM normal, no CVA tenderness, no spinal tenderness   CVS: There is good S1  S2, there are no murmurs, rubs, gallops, or heaves, rhythm is regular,  2+ pulses symmetric in all extremities.  ABDOMEN: Globular and soft, nontender to palpation, non distended, no masses, no organomegaly, good bowel sounds, no rebound or guarding, no peritoneal signs.   EXTREMITIES:  Full range of motion on both upper and lower extremities, there is no tenderness to palpation, no pedal edema, no cyanosis or clubbing, no calf tenderness.  Pulses equal in all extremities, normal cap refill, no joint swelling.  SKIN: Warm and dry, no erythema noted.  Skin color, texture, no rashes or lesions.  NEUROLOGICAL: The patient is oriented to person, place and time. Strength and sensation are grossly intact. Face is symmetric.    LABS:      Lab Results   Component Value Date    WBC 6.2 08/12/2019    HGB 9.4 (L) 08/12/2019    HCT 31.1 (L) 08/12/2019    MCV 91 08/12/2019     08/12/2019     Results for orders placed or performed in visit on 08/12/19   Basic Metabolic Panel   Result Value Ref Range    Sodium 141 136 - 145 mmol/L    Potassium 4.1 3.5 - 5.0 mmol/L    Chloride 103 98 - 107 mmol/L    CO2 31 22 - 31 mmol/L    Anion Gap, Calculation 7 5 - 18 mmol/L    Glucose 111 70 - 125 mg/dL    Calcium 9.3 8.5 - 10.5 mg/dL    BUN 30 (H) 8 - 28 mg/dL    Creatinine 1.09 0.70 - 1.30 mg/dL    GFR MDRD Af Amer >60 >60 mL/min/1.73m2    GFR MDRD Non Af Amer >60 >60 mL/min/1.73m2     Lab Results   Component Value Date    HGBA1C 6.9 (H) 06/22/2019     Vitamin D, Total (25-Hydroxy)   Date Value Ref Range Status   04/03/2017 6.6 (L) 30.0 - 80.0 ng/mL Final     Lab Results   Component Value Date    [id number] >2,000 (H) 08/02/2019       ASSESSMENT/PLAN:    1. Acute on chronic diastolic congestive heart failure (H) - No overt signs or symptoms of  decompensation. Will continue Spironolactone and decreased dose of Lasix    2. Persistent atrial fibrillation (H) - Anticoagulated with Coumadin   3. Frequent falls - No recent falls, continue PT/OT   4. Type 2 diabetes mellitus with complication, without long-term current use of insulin (H)    5. Bilateral lower extremity edema - Chronic, see above   6. Dehydration - Improved, see above         Electronically signed by:  Dario Santillan CNP    Total time spent on the unit was 40 minutes of which 25 minutes was spent in counseling Safety precautions to prevent falls and Coumadin and coordination of care of the above plan with nursing staff, patient, and therapy.

## 2021-06-25 NOTE — PROGRESS NOTES
Progress Notes by Andrew Callejas DO at 7/6/2017 11:30 AM     Author: Andrew Callejas DO Service: -- Author Type: Physician    Filed: 7/6/2017 12:59 PM Encounter Date: 7/6/2017 Status: Signed    : Andrew Callejas DO (Physician)           Click to link to [facility]     [facility] HEART CARE NOTE      Assessment/Recommendations   Assessment:    1.  Chronic congestive heart failure secondary to left ventricular diastolic dysfunction (LVEF:60%, NYHA II).  Euvolemic on exam today.    2.  Coronary artery disease status post two-vessel bypass surgery.  No anginal chest pain   3.  Aortic valve disease status post bovine prosthetic aortic valve replacement (2014).   4.  Essential Hypertension  5.  Diabetes mellitus type 2 with complications  6.  Hyperlipidemia  7.  Sleep apnea  8.  Asymptomatic permanent atrial fibrillation   9.  Syncope.     Recommendations:    1. Decrease Lasix 40 mg PO daily    2. Continue aspirin, atorvastatin for CAD  3. Continue spironolactone and digoxin 125 mcg daily   4. Continue warfarin for permanent atrial fibrillation  5. Decrease Diltiezem  mg daily for rate control.     6. 24 hours Holter monitor.     7. INR check today .    8. Patient no longer driving.     Follow-up in 6 months.  Call instructions provided to patient and daughter.         History of Present Illness    Mr. Modesto Martinez is 85 y.o. male who presents in follow-up for diastolic congestive heart failure,  coronary artery disease, aortic valve replacement in 2014, atrial fibrillation and episodes of near syncope.      Going to the patient after being hospitalized in March 2016 is began to experience intermittent episodes of lightheadedness with near syncope particularly when ambulating outside.  Patient has not experienced a syncopal episode while ambulating outside.  He did have one syncopal episode while having a large bowel movement in March 2017 when she was hospitalized  for acute bleed.  Hospitalization his Lasix therapy was increased.      He denies any near-syncope or syncopal episodes while sitting or lying flat.  Denies significant lightheadedness with standing initially in the morning.  He denies any new dyspnea on exertion.  His lower extremity edema has improved.  He denies any anginal chest pain.  Recent digoxin level was 0.8 (reviewed from outside records).       Physical Examination Review of Systems   Vitals:    07/06/17 1141   BP: 133/65   Pulse: 68   Resp: 16   SpO2: 99%     Body mass index is 31.4 kg/(m^2).  Wt Readings from Last 3 Encounters:   07/06/17 (!) 238 lb (108 kg)   04/10/17 (!) 247 lb (112 kg)   04/06/17 (!) 248 lb 14.4 oz (112.9 kg)     General Appearance:   no distress, obese body habitus   ENT/Mouth: membranes moist, no oral lesions or bleeding gums.      EYES:  no scleral icterus, normal conjunctivae   Neck: no carotid bruits or thyromegaly   Chest/Lungs:   lungs are clear to auscultation, no rales or wheezing, noted sternal scar, equal chest wall expansion    Cardiovascular:   Irregular. Normal first and second heart sounds with no systolic murmurs, rubs, or gallops; the carotid, radial and posterior tibial pulses are intact, Jugular venous pressure normal, trace pitting edema    Abdomen:  no  tenderness; bowel sounds are present   Extremities: no cyanosis or clubbing   Skin: no xanthelasma, warm.    Neurologic: normal  bilateral     Psychiatric: alert and oriented x3, calm     General: Weight Loss  Eyes: Visual Distubance  Ears/Nose/Throat: Hearing Loss, Nosebleeds  Lungs: WNL  Heart: Irregular Heartbeat, Fainting  Stomach: WNL  Bladder: WNL  Muscle/Joints: Joint Pain, Muscle Weakness  Skin: WNL  Nervous System: Falls, Daytime Sleepiness, Dizziness, Loss of Balance  Mental Health: WNL     Blood: Easy Bleeding, Easy Bruising       Medical History  Surgical History Family History Social History   Past Medical History:   Diagnosis Date   ? Aortic  stenosis    ? Arthritis     knees   ? Atrial fibrillation    ? CAD (coronary artery disease)    ? CHF (congestive heart failure)    ? Diabetes mellitus, type II    ? HTN (hypertension)    ? Hyperlipidemia     controlled on medications   ? KRISHAN (obstructive sleep apnea)     no CPAP    Past Surgical History:   Procedure Laterality Date   ? BACK SURGERY  1970    L5   ? CARDIAC VALVE SURGERY      cow valve   ? EYE SURGERY Bilateral     cataracts    Family History   Problem Relation Age of Onset   ? Cancer Mother    ? Diabetes Father     Social History     Social History   ? Marital status:      Spouse name: N/A   ? Number of children: N/A   ? Years of education: N/A     Occupational History   ? Not on file.     Social History Main Topics   ? Smoking status: Never Smoker   ? Smokeless tobacco: Never Used   ? Alcohol use No   ? Drug use: No   ? Sexual activity: Not on file     Other Topics Concern   ? Not on file     Social History Narrative          Medications  Allergies   Current Outpatient Prescriptions on File Prior to Visit   Medication Sig Dispense Refill   ? albuterol (PROVENTIL HFA;VENTOLIN HFA) 90 mcg/actuation inhaler Inhale 2 puffs every 4 (four) hours as needed for wheezing or shortness of breath.     ? albuterol (PROVENTIL) 2.5 mg /3 mL (0.083 %) nebulizer solution Take 2.5 mg by nebulization every 4 (four) hours as needed for wheezing.     ? atorvastatin (LIPITOR) 40 MG tablet Take 40 mg by mouth bedtime.     ? calcium carbonate-vitamin D3 (CALCIUM 600 + D,3,) 600 mg(1,500mg) -400 unit per tablet Take 1 tablet by mouth daily.     ? digoxin (LANOXIN) 125 mcg tablet Take 1 tablet (125 mcg total) by mouth daily. 30 tablet 0   ? furosemide (LASIX) 40 MG tablet Take 1 tablet (40 mg total) by mouth daily. (Patient taking differently: Take 60 mg by mouth daily. )  0   ? glipiZIDE (GLUCOTROL) 10 MG 24 hr tablet Take 10 mg by mouth every morning.      ? lansoprazole (PREVACID) 30 MG capsule Take 30 mg by mouth  every morning.      ? senna-docusate (PERICOLACE) 8.6-50 mg tablet Take 1 tablet by mouth 2 (two) times a day as needed for constipation.     ? senna-docusate (SENNOSIDES-DOCUSATE SODIUM) 8.6-50 mg tablet Take 1 tablet by mouth 2 (two) times a day.      ? sitaGLIPtin (JANUVIA) 100 MG tablet Take 50 mg by mouth every morning. Takes 1/2 of 100mg tablet for 50mg dose.     ? spironolactone (ALDACTONE) 25 MG tablet Take 12.5 mg by mouth every morning. Takes 1/2 of 25mg tablet for 12.5mg dose     ? warfarin (COUMADIN) 4 MG tablet Take 1 tablet (4 mg total) by mouth daily. Restart on Tuesday 3/21/17 - further management by PCP or TCU physicians. 30 tablet 0   ? white petrolatum (AQUAPHOR NATURAL HEALING) 41 % Oint Apply small amount to bilateral inner nostrils with Qtip twice per day for 30 days. 50 g 1   ? [DISCONTINUED] diltiazem (CARDIZEM CD) 180 MG 24 hr capsule Take 180 mg by mouth every morning.      ? ondansetron (ZOFRAN) 4 MG tablet Take 4 mg by mouth every 8 (eight) hours as needed for nausea.     ? polyethylene glycol (MIRALAX) 17 gram packet Take 1 packet (17 g total) by mouth daily as needed. 24 each 0     No current facility-administered medications on file prior to visit.       Allergies   Allergen Reactions   ? Levaquin [Levofloxacin]          Lab Results    Chemistry/lipid CBC Cardiac Enzymes/BNP/TSH   Lab Results   Component Value Date    CREATININE 1.09 03/27/2017    BUN 11 03/27/2017    K 3.7 03/27/2017     03/27/2017     03/27/2017    CO2 29 03/27/2017    Lab Results   Component Value Date    WBC 4.9 03/27/2017    HGB 9.3 (L) 03/27/2017    HCT 29.3 (L) 03/27/2017    MCV 99 03/27/2017     (L) 03/27/2017    Lab Results   Component Value Date    CKMB 5 02/27/2016    TROPONINI 0.07 03/15/2017     (H) 02/27/2016    TSH 2.64 04/03/2017

## 2021-06-26 NOTE — PROGRESS NOTES
Progress Notes by Andrew Callejas DO at 4/17/2018 11:10 AM     Author: Andrew Callejas DO Service: -- Author Type: Physician    Filed: 4/17/2018  4:44 PM Encounter Date: 4/17/2018 Status: Signed    : Andrew Callejas DO (Physician)           Click to link to [facility]     [facility] [place] HEART CARE NOTE      Assessment/Recommendations   Assessment:    1.  Chronic congestive heart failure secondary to left ventricular diastolic dysfunction (LVEF:60%, NYHA II, stable).  Euvolemic on exam today.    2.  Coronary artery disease status post two-vessel bypass surgery.  No anginal chest pain   3.  Aortic valve disease status post bovine prosthetic aortic valve replacement (2014).   4.  Essential Hypertension  5.  Diabetes mellitus type 2 with complications  6.  Hyperlipidemia  7.  Sleep apnea  8.  Asymptomatic permanent atrial fibrillation   9.  Syncope.  Last near syncope was 3 months ago.  Occurs with bright light to darkness.      Recommendations:    1. Lasix 40 mg PO daily    2. Continue aspirin, atorvastatin for CAD  3. Continue spironolactone and digoxin 125 mcg daily   4. Continue warfarin for permanent atrial fibrillation  5.  Diltiezem  mg daily for rate control.       Follow-up in 12 months.  Call instructions provided to patient and daughter.         History of Present Illness    Mr. Modesto Martinez is 85 y.o. male who presents in follow-up for diastolic congestive heart failure,  coronary artery disease, aortic valve replacement in 2014, atrial fibrillation and episodes of near syncope.      Since last evaluation the patient has done well.  Had one episode of near syncope which occurred when he transition from bright light outside to indoors.  This is have multiple times for the patient.  He was caught by his daughter.  His daughter is with him today and agrees that he is doing quite well overall.  He is on stable medications.  His rate appears to be well controlled  today.  Currently has no chest pain, orthopnea or PND symptoms.  He does have shortness of breath with ambulating stairs which is been stable.  He has no significant lower extremity edema.  The patient feels well today.  Recent outpatient progress note from his primary care provider was reviewed from March 8, 2018.         Physical Examination Review of Systems   Vitals:    04/17/18 1123   BP: 140/60   Pulse: 67   Resp: 12     Body mass index is 31.27 kg/(m^2).  Wt Readings from Last 3 Encounters:   07/06/17 (!) 238 lb (108 kg)   04/10/17 (!) 247 lb (112 kg)   04/06/17 (!) 248 lb 14.4 oz (112.9 kg)     General Appearance:   no distress, obese body habitus   ENT/Mouth: membranes moist, no oral lesions or bleeding gums.      EYES:  no scleral icterus, normal conjunctivae   Neck: no carotid bruits or thyromegaly   Chest/Lungs:   lungs are clear to auscultation, no rales or wheezing.    Cardiovascular:   Irregular. Normal first and second heart sounds with no systolic murmurs, rubs, or gallops; the carotid, radial and posterior tibial pulses are intact, Jugular venous pressure normal, no pitting edema (improved)    Abdomen:  no  tenderness; bowel sounds are present   Extremities: no cyanosis or clubbing   Skin: no xanthelasma, warm.    Neurologic: normal  bilateral     Psychiatric: alert and oriented x3, calm     General: WNL  Eyes: Visual Distubance  Ears/Nose/Throat: Hearing Loss  Lungs: WNL  Heart: WNL  Stomach: WNL  Bladder: WNL  Muscle/Joints: WNL  Skin: WNL  Nervous System: Dizziness  Mental Health: WNL     Blood: Easy Bleeding, Easy Bruising       Medical History  Surgical History Family History Social History   Past Medical History:   Diagnosis Date   ? Aortic stenosis    ? Arthritis     knees   ? Atrial fibrillation    ? CAD (coronary artery disease)    ? CHF (congestive heart failure)    ? Diabetes mellitus, type II    ? HTN (hypertension)    ? Hyperlipidemia     controlled on medications   ? KRISHAN  (obstructive sleep apnea)     no CPAP    Past Surgical History:   Procedure Laterality Date   ? BACK SURGERY  1970    L5   ? CARDIAC VALVE SURGERY      cow valve   ? EYE SURGERY Bilateral     cataracts    Family History   Problem Relation Age of Onset   ? Cancer Mother    ? Diabetes Father     Social History     Social History   ? Marital status:      Spouse name: N/A   ? Number of children: N/A   ? Years of education: N/A     Occupational History   ? Not on file.     Social History Main Topics   ? Smoking status: Never Smoker   ? Smokeless tobacco: Never Used   ? Alcohol use No   ? Drug use: No   ? Sexual activity: Not on file     Other Topics Concern   ? Not on file     Social History Narrative          Medications  Allergies   Current Outpatient Prescriptions on File Prior to Visit   Medication Sig Dispense Refill   ? albuterol (PROVENTIL HFA;VENTOLIN HFA) 90 mcg/actuation inhaler Inhale 2 puffs every 4 (four) hours as needed for wheezing or shortness of breath.     ? albuterol (PROVENTIL) 2.5 mg /3 mL (0.083 %) nebulizer solution Take 2.5 mg by nebulization every 4 (four) hours as needed for wheezing.     ? atorvastatin (LIPITOR) 40 MG tablet Take 40 mg by mouth bedtime.     ? calcium carbonate-vitamin D3 (CALCIUM 600 + D,3,) 600 mg(1,500mg) -400 unit per tablet Take 1 tablet by mouth daily.     ? digoxin (LANOXIN) 125 mcg tablet Take 1 tablet (125 mcg total) by mouth daily. 30 tablet 0   ? diltiazem (CARDIZEM CD) 120 MG 24 hr capsule Take 1 capsule (120 mg total) by mouth every morning. 90 capsule 3   ? furosemide (LASIX) 40 MG tablet Take 1 tablet (40 mg total) by mouth daily. (Patient taking differently: Take 60 mg by mouth daily. )  0   ? glipiZIDE (GLUCOTROL) 10 MG 24 hr tablet Take 10 mg by mouth every morning.      ? lansoprazole (PREVACID) 30 MG capsule Take 30 mg by mouth every morning.      ? ondansetron (ZOFRAN) 4 MG tablet Take 4 mg by mouth every 8 (eight) hours as needed for nausea.     ?  polyethylene glycol (MIRALAX) 17 gram packet Take 1 packet (17 g total) by mouth daily as needed. 24 each 0   ? senna-docusate (PERICOLACE) 8.6-50 mg tablet Take 1 tablet by mouth 2 (two) times a day as needed for constipation.     ? senna-docusate (SENNOSIDES-DOCUSATE SODIUM) 8.6-50 mg tablet Take 1 tablet by mouth 2 (two) times a day.      ? sitaGLIPtin (JANUVIA) 100 MG tablet Take 50 mg by mouth every morning. Takes 1/2 of 100mg tablet for 50mg dose.     ? spironolactone (ALDACTONE) 25 MG tablet Take 12.5 mg by mouth every morning. Takes 1/2 of 25mg tablet for 12.5mg dose     ? warfarin (COUMADIN) 4 MG tablet Take 1 tablet (4 mg total) by mouth daily. Restart on Tuesday 3/21/17 - further management by PCP or TCU physicians. 30 tablet 0   ? white petrolatum (AQUAPHOR NATURAL HEALING) 41 % Oint Apply small amount to bilateral inner nostrils with Qtip twice per day for 30 days. 50 g 1     No current facility-administered medications on file prior to visit.       Allergies   Allergen Reactions   ? Levaquin [Levofloxacin]          Lab Results    Chemistry/lipid CBC Cardiac Enzymes/BNP/TSH   Lab Results   Component Value Date    CREATININE 1.09 03/27/2017    BUN 11 03/27/2017    K 3.7 03/27/2017     03/27/2017     03/27/2017    CO2 29 03/27/2017    Lab Results   Component Value Date    WBC 4.9 03/27/2017    HGB 9.3 (L) 03/27/2017    HCT 29.3 (L) 03/27/2017    MCV 99 03/27/2017     (L) 03/27/2017    Lab Results   Component Value Date    CKMB 5 02/27/2016    TROPONINI 0.07 03/15/2017     (H) 02/27/2016    TSH 2.64 04/03/2017

## 2021-06-27 NOTE — PROGRESS NOTES
Progress Notes by Mackenzie Richardson CNP at 6/21/2019  3:30 PM     Author: Mackenzie Richardson CNP Service: -- Author Type: Nurse Practitioner    Filed: 6/21/2019  4:16 PM Encounter Date: 6/21/2019 Status: Signed    : Mackenzie Richardson CNP (Nurse Practitioner)           Click to link to [facility] HEART CARE NOTE      Assessment/Recommendations   Assessment:    1.  Heart failure with preserved ejection fraction, NYHA class IV: Decompensated.  He has fatigue, shortness of breath at rest, and severe lower extremity edema.  His Lasix was increased to 40 mg twice a day at the end of May but has not seen much improvement.  Oxygen sats were 94% on room air.  I recommended admission for IV diuretics for his acute exacerbation of heart failure.  He is only requesting [facility]. Spoke with admitting hospitalist Dr. Lay. Provided daughter room number and she will transport father to hospital.    2.  Persistent atrial fibrillation: Rate controlled.  He continues warfarin for anticoagulation.  He is also on diltiazem and digoxin for rate control.    3.  Hypertension: Controlled.  Blood pressure 118/52    Plan:  1. Admit to [facility] for IV diuretics       Modesto Martinez will follow up with Dr Callejas in 6-8 weeks and in the heart failure clinic in 3 weeks.     History of Present Illness    Mr. Modesto Martinez is a 87 y.o. male seen at [facility] heart failure clinic today for continued follow-up.  His daughter Katelynn accompanies him today.  He follows up for heart failure with preserved ejection fraction.  He had an echocardiogram July 2018 which showed an ejection fraction of 56% with grade 2 diastolic dysfunction.  He has a past medical history significant for hypertension, coronary disease, hyperlipidemia, persistent atrial relation, obstructive sleep apnea, diabetes, and syncope.  Status post AVR in 2012.    He called the heart clinic about a month ago  with symptoms of increased fatigue, dyspnea on exertion, weight gain, and lower extremity edema.  Dr. Callejas increased his Lasix to 40 mg twice a day.  He has had slight decrease in weight but no improvement in shortness of breath or lower extremity edema.  Today, he seems dyspneic at rest.  His oxygen level is 94% on room air.  His daughter states with any activity he gets short of breath.  He denies orthopnea, PND, or chest pain.  He denies lightheadedness, orthopnea, PND, palpitations, chest pain and abdominal fullness/bloating.      He is monitoring home weights which have decreased from 231 to 223 pounds in the last 3 weeks.  He is following a low sodium diet.  He receives Meals on Wheels.      ECHO 7/18/2018:   Summary       Left ventricle ejection fraction is normal. The calculated left ventricular ejection fraction is 56%. No wall motion abnormality    Grade 2 diastolic dysfunction    Normal mean gradient across the bioprosthetic valve of 4 mmHg. There is abnormal color flow during diastole and what appears to be the descending aorta of unclear etiology. Consider transesophageal echo for further evaluation.    Mild mitral stenosis with mild mitral regurgitation    Trace tricuspid regurgitation with mild pulmonary hypertension    When compared to the previous study dated 2/28/2016, abnormal diastolic flow in the region of the bioprosthetic aortic valve is new. Consider transesophageal echo for further evaluation. No other interval change.               Physical Examination Review of Systems   Vitals:    06/21/19 1535   BP: 118/52   Pulse: (!) 59   Resp: 12     Body mass index is 30.24 kg/m .  Wt Readings from Last 3 Encounters:   06/21/19 (!) 223 lb (101.2 kg)   06/03/19 (!) 231 lb (104.8 kg)   04/26/19 216 lb (98 kg)       General Appearance:     Alert, cooperative and in no acute distress.   ENT/Mouth: membranes moist, no oral lesions or bleeding gums.      EYES:  no scleral icterus, normal conjunctivae    Neck: no carotid bruits or thyromegaly   Chest/Lungs:   lungs are clear to auscultation, no rales or wheezing, respirations unlabored   Cardiovascular:    Irregularly irregular. Normal first and second heart sounds with grade 2/6 systolic murmur, no rubs, or gallops; the carotid, radial and posterior tibial pulses are intact, Jugular venous pressure normal, 4+ pitting edema up to thigh area   Abdomen:  Soft, nontender, nondistended, bowel sounds present   Extremities: no cyanosis or clubbing   Skin: warm, dry.    Neurologic: mood and affect are appropriate, alert and oriented x3      General: Weight Loss  Eyes: Visual Distubance  Ears/Nose/Throat: WNL  Lungs: Shortness of Breath  Heart: Irregular Heartbeat, Leg Swelling  Stomach: WNL  Bladder: WNL  Muscle/Joints: Muscle Weakness  Skin: WNL  Nervous System: WNL  Mental Health: WNL     Blood: WNL     Medical History  Surgical History Family History Social History   Past Medical History:   Diagnosis Date   ? Aortic stenosis    ? Arthritis     knees   ? Atrial fibrillation (H)    ? CAD (coronary artery disease)    ? CHF (congestive heart failure) (H)    ? Diabetes mellitus, type II (H)    ? HTN (hypertension)    ? Hyperlipidemia     controlled on medications   ? KRISHAN (obstructive sleep apnea)     no CPAP    Past Surgical History:   Procedure Laterality Date   ? BACK SURGERY  1970    L5   ? CARDIAC VALVE SURGERY      cow valve   ? EYE SURGERY Bilateral     cataracts    Family History   Problem Relation Age of Onset   ? Cancer Mother    ? Diabetes Father     Social History     Socioeconomic History   ? Marital status:      Spouse name: Not on file   ? Number of children: Not on file   ? Years of education: Not on file   ? Highest education level: Not on file   Occupational History   ? Not on file   Social Needs   ? Financial resource strain: Not on file   ? Food insecurity:     Worry: Not on file     Inability: Not on file   ? Transportation needs:     Medical:  Not on file     Non-medical: Not on file   Tobacco Use   ? Smoking status: Never Smoker   ? Smokeless tobacco: Never Used   Substance and Sexual Activity   ? Alcohol use: No   ? Drug use: No   ? Sexual activity: Not on file   Lifestyle   ? Physical activity:     Days per week: Not on file     Minutes per session: Not on file   ? Stress: Not on file   Relationships   ? Social connections:     Talks on phone: Not on file     Gets together: Not on file     Attends Zoroastrian service: Not on file     Active member of club or organization: Not on file     Attends meetings of clubs or organizations: Not on file     Relationship status: Not on file   ? Intimate partner violence:     Fear of current or ex partner: Not on file     Emotionally abused: Not on file     Physically abused: Not on file     Forced sexual activity: Not on file   Other Topics Concern   ? Not on file   Social History Narrative   ? Not on file          Medications  Allergies   Current Outpatient Medications   Medication Sig Dispense Refill   ? atorvastatin (LIPITOR) 40 MG tablet Take 40 mg by mouth at bedtime.     ? cholecalciferol, vitamin D3, 1,000 unit tablet Take 1,000 Units by mouth daily.     ? cyanocobalamin 1000 MCG tablet Take 1,000 mcg by mouth daily.     ? digoxin (LANOXIN) 125 mcg tablet Take 1 tablet (125 mcg total) by mouth daily. 30 tablet 0   ? diltiazem (CARDIZEM CD) 120 MG 24 hr capsule Take 1 capsule (120 mg total) by mouth every morning. 90 capsule 3   ? furosemide (LASIX) 40 MG tablet Take 1 tablet (40 mg total) by mouth 2 (two) times a day at 9am and 6pm. 60 tablet 12   ? glipiZIDE (GLUCOTROL) 10 MG 24 hr tablet Take 10 mg by mouth every morning.      ? lansoprazole (PREVACID) 30 MG capsule Take 30 mg by mouth every morning.      ? linagliptin 5 mg Tab Take 5 mg by mouth daily.     ? spironolactone (ALDACTONE) 25 MG tablet Take 12.5 mg by mouth every morning. Takes 1/2 of 25mg tablet for 12.5mg dose     ? warfarin  (COUMADIN/JANTOVEN) 3 MG tablet Take by mouth.       No current facility-administered medications for this visit.       Allergies   Allergen Reactions   ? Levaquin [Levofloxacin]          Lab Results    Chemistry CBC BNP   Lab Results   Component Value Date    CREATININE 1.44 (H) 06/03/2019    BUN 34 (H) 06/03/2019     06/03/2019    K 4.3 06/03/2019     06/03/2019    CO2 30 06/03/2019     Creatinine (mg/dL)   Date Value   06/03/2019 1.44 (H)   07/20/2018 1.55 (H)   07/19/2018 1.44 (H)   07/18/2018 1.41 (H)    Lab Results   Component Value Date    WBC 8.5 06/03/2019    HGB 9.7 (L) 06/03/2019    HCT 32.6 (L) 06/03/2019    MCV 92 06/03/2019     06/03/2019    Lab Results   Component Value Date     (H) 06/03/2019     BNP (pg/mL)   Date Value   06/03/2019 430 (H)   07/17/2018 298 (H)   02/27/2016 343 (H)          40 minutes were spent with the patient with greater than 50% spent on education and counseling.      Mackenzie Richardson, [facility]   Heart Failure Clinic

## 2021-06-27 NOTE — PROGRESS NOTES
Progress Notes by Haider Darby MD (Ted) at 6/3/2019 11:20 AM     Author: Haider Darby MD (Ted) Service: -- Author Type: Physician    Filed: 6/3/2019 11:52 AM Encounter Date: 6/3/2019 Status: Signed    : Haider Darby MD (Ted) (Physician)           Click to link to [facility] HE[facility] NOTE    Thank you, Dr. Lobato, for asking the [facility] to evaluate Mr. Modesto Martinez.      Assessment/Recommendations   Assessment:    Acute on chronic heart failure with preserved ejection fraction with predominance of right-sided symptoms  Bioprosthetic aortic valve, normal auscultation  Coronary artery disease status post CABG, no angina  Chronic atrial fibrillation, rate controlled, chronic anticoagulation  Diabetes mellitus  Sleep apnea      Plan:  Basic metabolic panel, BNP and CBC today  Continue furosemide 40 mg twice a day and spironolactone, may consider hospitalization for IV diuretics if edema does not resolve with current dosing of furosemide  Echo  Follow-up based on the results of the blood test and echo     History of Present Illness    Mr. Modesto Martinez is a 87 y.o. male who comes into rapid access clinic because of progressive lower extremity edema and shortness of breath.  The symptoms developed gradually over the course of last several months.  Last week he increased dose of furosemide to 40 mg twice a day.  He has noticed reduced swelling of lower extremities and weight loss as well.  He is not short of breath at rest.  He denies PND orthopnea.  He has not had chest pain.  He has a history of heart failure with preserved ejection fraction.  He was hospitalized and IV diuresed last summer  for similar symptoms.  Echo showed preserved LV systolic function and normal function of bioprosthesis.  He has a history of double bypass surgery and aortic valve replacement in 2014        Echocardiogram: July 2018    Left ventricle  ejection fraction is normal. The calculated left ventricular ejection fraction is 56%. No wall motion abnormality    Grade 2 diastolic dysfunction    Normal mean gradient across the bioprosthetic valve of 4 mmHg. There is abnormal color flow during diastole and what appears to be the descending aorta of unclear etiology. Consider transesophageal echo for further evaluation.    Mild mitral stenosis with mild mitral regurgitation    Trace tricuspid regurgitation with mild pulmonary hypertension    When compared to the previous study dated 2/28/2016, abnormal diastolic flow in the region of the bioprosthetic aortic valve is new. Consider transesophageal echo for further evaluation. No other interval change.        Physical Examination Review of Systems   Vitals:    06/03/19 1115   BP: 112/42   Pulse: 60   Resp: 16     Body mass index is 31.33 kg/m .  Wt Readings from Last 3 Encounters:   06/03/19 (!) 231 lb (104.8 kg)   04/26/19 216 lb (98 kg)   07/30/18 (!) 224 lb 12.8 oz (102 kg)     General Appearance:   Alert, cooperative, no distress, appears stated age   Head/ENT: Normocephalic, without obvious abnormality. Membranes moist      EYES:  no scleral icterus, normal conjunctivae   Neck: Supple, symmetrical, trachea midline, no adenopathy, thyroid: not enlarged, symmetric, no carotid bruit or JVD   Chest/Lungs:   Lungs are clear to auscultation, respirations unlabored. No tenderness or deformity    Cardiovascular:   Irregular rhythm, S1, S2 normal, no , rub or gallop.  2/6 holosystolic murmur at the apex   Abdomen:  Soft, non-tender, bowel sounds active all four quadrants,  no masses, no organomegaly   Extremities: no cyanosis or clubbing.  3+ edema   Skin: Skin color, texture, turgor normal, no rashes or lesions.    Psychiatric: Normal affect, calm   Neurologic: Alert and oriented x 3, moving all four extremities.     General: WNL  Eyes: WNL  Ears/Nose/Throat: WNL  Lungs: Shortness of Breath  Heart: Shortness of  Breath with activity, Leg Swelling  Stomach: WNL  Bladder: Frequent Urination at Night  Muscle/Joints: Muscle Weakness  Skin: Poor Wound Healing  Nervous System: WNL  Mental Health: Confusion     Blood: Easy Bruising     Medical History  Surgical History Family History Social History   Past Medical History:   Diagnosis Date   ? Aortic stenosis    ? Arthritis     knees   ? Atrial fibrillation (H)    ? CAD (coronary artery disease)    ? CHF (congestive heart failure) (H)    ? Diabetes mellitus, type II (H)    ? HTN (hypertension)    ? Hyperlipidemia     controlled on medications   ? KRISHAN (obstructive sleep apnea)     no CPAP    Past Surgical History:   Procedure Laterality Date   ? BACK SURGERY  1970    L5   ? CARDIAC VALVE SURGERY      cow valve   ? EYE SURGERY Bilateral     cataracts    no family history of premature coronary artery disease Social History     Socioeconomic History   ? Marital status:      Spouse name: Not on file   ? Number of children: Not on file   ? Years of education: Not on file   ? Highest education level: Not on file   Occupational History   ? Not on file   Social Needs   ? Financial resource strain: Not on file   ? Food insecurity:     Worry: Not on file     Inability: Not on file   ? Transportation needs:     Medical: Not on file     Non-medical: Not on file   Tobacco Use   ? Smoking status: Never Smoker   ? Smokeless tobacco: Never Used   Substance and Sexual Activity   ? Alcohol use: No   ? Drug use: No   ? Sexual activity: Not on file   Lifestyle   ? Physical activity:     Days per week: Not on file     Minutes per session: Not on file   ? Stress: Not on file   Relationships   ? Social connections:     Talks on phone: Not on file     Gets together: Not on file     Attends Muslim service: Not on file     Active member of club or organization: Not on file     Attends meetings of clubs or organizations: Not on file     Relationship status: Not on file   ? Intimate partner violence:      Fear of current or ex partner: Not on file     Emotionally abused: Not on file     Physically abused: Not on file     Forced sexual activity: Not on file   Other Topics Concern   ? Not on file   Social History Narrative   ? Not on file          Medications  Allergies   Current Outpatient Medications   Medication Sig Dispense Refill   ? atorvastatin (LIPITOR) 40 MG tablet Take 40 mg by mouth bedtime.     ? cholecalciferol, vitamin D3, 1,000 unit tablet Take 1,000 Units by mouth daily.     ? cyanocobalamin 1000 MCG tablet Take 1,000 mcg by mouth daily.     ? digoxin (LANOXIN) 125 mcg tablet Take 1 tablet (125 mcg total) by mouth daily. 30 tablet 0   ? diltiazem (CARDIZEM CD) 120 MG 24 hr capsule Take 1 capsule (120 mg total) by mouth every morning. 90 capsule 3   ? furosemide (LASIX) 40 MG tablet Take 1 tablet (40 mg total) by mouth 2 (two) times a day at 9am and 6pm. 60 tablet 12   ? glipiZIDE (GLUCOTROL) 10 MG 24 hr tablet Take 10 mg by mouth every morning.      ? lansoprazole (PREVACID) 30 MG capsule Take 30 mg by mouth every morning.      ? linagliptin 5 mg Tab Take 5 mg by mouth daily.     ? spironolactone (ALDACTONE) 25 MG tablet Take 12.5 mg by mouth every morning. Takes 1/2 of 25mg tablet for 12.5mg dose     ? warfarin sodium (WARFARIN DAILY DOSE) Take by mouth daily before supper. 7/30/18 INR 1.9  Take 3mg daily.  Next INR 8/3/18.  7/24/18 INR 2.1 Take 2.5mg daily Next INR 7/30 7/22/18 INR 2.0 had 2.5mg. No further INR/coumadin orders. Give 2.5mg today. Check INR in am 7/23.  7/20/18 INR 2.85 2.5mg daily.     ? warfarin (COUMADIN) 5 MG tablet Take 0.5 tablets (2.5 mg total) by mouth See Admin Instructions for 2 days. 2.5 mg for 2 days then adjust based on INR check in 2 days. 2 tablet 0     No current facility-administered medications for this visit.       Allergies   Allergen Reactions   ? Levaquin [Levofloxacin]          Lab Results    Chemistry/lipid CBC Cardiac Enzymes/BNP/TSH/INR   Lab Results    Component Value Date    CREATININE 1.55 (H) 07/20/2018    BUN 28 07/20/2018    K 4.2 07/20/2018     07/20/2018    CL 98 07/20/2018    CO2 29 07/20/2018    Lab Results   Component Value Date    WBC 8.8 07/17/2018    HGB 11.3 (L) 07/17/2018    HCT 35.2 (L) 07/17/2018    MCV 90 07/17/2018     07/17/2018    Lab Results   Component Value Date    CKTOTAL 36 07/20/2018    CKMB 5 02/27/2016    TROPONINI 0.03 07/17/2018     (H) 07/17/2018    TSH 1.34 07/17/2018    INR 2.08 (H) 04/26/2019

## 2021-10-03 ENCOUNTER — HEALTH MAINTENANCE LETTER (OUTPATIENT)
Age: 86
End: 2021-10-03

## 2022-01-23 ENCOUNTER — HEALTH MAINTENANCE LETTER (OUTPATIENT)
Age: 87
End: 2022-01-23

## 2022-07-10 ENCOUNTER — HEALTH MAINTENANCE LETTER (OUTPATIENT)
Age: 87
End: 2022-07-10

## 2022-09-11 ENCOUNTER — HEALTH MAINTENANCE LETTER (OUTPATIENT)
Age: 87
End: 2022-09-11

## 2023-04-30 ENCOUNTER — HEALTH MAINTENANCE LETTER (OUTPATIENT)
Age: 88
End: 2023-04-30

## 2023-07-23 ENCOUNTER — HEALTH MAINTENANCE LETTER (OUTPATIENT)
Age: 88
End: 2023-07-23

## 2023-12-10 ENCOUNTER — HEALTH MAINTENANCE LETTER (OUTPATIENT)
Age: 88
End: 2023-12-10